# Patient Record
Sex: FEMALE | Race: WHITE | Employment: UNEMPLOYED | URBAN - METROPOLITAN AREA
[De-identification: names, ages, dates, MRNs, and addresses within clinical notes are randomized per-mention and may not be internally consistent; named-entity substitution may affect disease eponyms.]

---

## 2017-03-13 ENCOUNTER — HOSPITAL ENCOUNTER (OUTPATIENT)
Age: 62
Setting detail: SPECIMEN
Discharge: HOME | End: 2017-03-13
Payer: COMMERCIAL

## 2017-03-13 ENCOUNTER — OFFICE VISIT (OUTPATIENT)
Dept: FAMILY MEDICINE CLINIC | Age: 62
End: 2017-03-13
Payer: COMMERCIAL

## 2017-03-13 VITALS
BODY MASS INDEX: 31.58 KG/M2 | WEIGHT: 184 LBS | SYSTOLIC BLOOD PRESSURE: 150 MMHG | HEART RATE: 60 BPM | DIASTOLIC BLOOD PRESSURE: 80 MMHG

## 2017-03-13 DIAGNOSIS — N30.00 ACUTE CYSTITIS WITHOUT HEMATURIA: Primary | ICD-10-CM

## 2017-03-13 DIAGNOSIS — Z12.39 BREAST CANCER SCREENING: ICD-10-CM

## 2017-03-13 LAB
BILIRUBIN, POC: ABNORMAL
BLOOD URINE, POC: ABNORMAL
CLARITY, POC: ABNORMAL
COLOR, POC: YELLOW
GLUCOSE URINE, POC: ABNORMAL
KETONES, POC: ABNORMAL
LEUKOCYTE EST, POC: ABNORMAL
NITRITE, POC: ABNORMAL
PH, POC: 5.5
PROTEIN, POC: 30
SPECIFIC GRAVITY, POC: 1.03
UROBILINOGEN, POC: 0.2

## 2017-03-13 PROCEDURE — 81003 URINALYSIS AUTO W/O SCOPE: CPT | Performed by: FAMILY MEDICINE

## 2017-03-13 PROCEDURE — 99213 OFFICE O/P EST LOW 20 MIN: CPT | Performed by: FAMILY MEDICINE

## 2017-03-13 RX ORDER — CIPROFLOXACIN 250 MG/1
250 TABLET, FILM COATED ORAL 2 TIMES DAILY
Qty: 20 TABLET | Refills: 0 | Status: SHIPPED | OUTPATIENT
Start: 2017-03-13 | End: 2017-03-23

## 2017-03-13 RX ORDER — OXYCODONE HYDROCHLORIDE AND ACETAMINOPHEN 5; 325 MG/1; MG/1
1 TABLET ORAL 4 TIMES DAILY PRN
Refills: 0 | COMMUNITY
Start: 2017-02-25 | End: 2018-11-20 | Stop reason: SDUPTHER

## 2017-03-13 ASSESSMENT — ENCOUNTER SYMPTOMS
SHORTNESS OF BREATH: 0
BACK PAIN: 0
DIARRHEA: 0
SORE THROAT: 0
NAUSEA: 0
SINUS PRESSURE: 0
VOMITING: 0
COUGH: 0

## 2017-03-13 ASSESSMENT — PATIENT HEALTH QUESTIONNAIRE - PHQ9
2. FEELING DOWN, DEPRESSED OR HOPELESS: 0
SUM OF ALL RESPONSES TO PHQ9 QUESTIONS 1 & 2: 0
SUM OF ALL RESPONSES TO PHQ QUESTIONS 1-9: 0
1. LITTLE INTEREST OR PLEASURE IN DOING THINGS: 0

## 2017-03-16 LAB
CULTURE: NORMAL
CULTURE: NORMAL
Lab: NORMAL
SPECIMEN DESCRIPTION: NORMAL
STATUS: NORMAL

## 2017-04-10 ENCOUNTER — OFFICE VISIT (OUTPATIENT)
Dept: FAMILY MEDICINE CLINIC | Age: 62
End: 2017-04-10
Payer: COMMERCIAL

## 2017-04-10 ENCOUNTER — HOSPITAL ENCOUNTER (OUTPATIENT)
Age: 62
Setting detail: SPECIMEN
Discharge: HOME | End: 2017-04-10
Payer: COMMERCIAL

## 2017-04-10 VITALS
BODY MASS INDEX: 31.58 KG/M2 | DIASTOLIC BLOOD PRESSURE: 80 MMHG | HEART RATE: 73 BPM | SYSTOLIC BLOOD PRESSURE: 130 MMHG | WEIGHT: 184 LBS

## 2017-04-10 DIAGNOSIS — R35.0 FREQUENT URINATION: ICD-10-CM

## 2017-04-10 DIAGNOSIS — R31.9 HEMATURIA: ICD-10-CM

## 2017-04-10 DIAGNOSIS — M47.12 CERVICAL SPONDYLOSIS WITH MYELOPATHY: Chronic | ICD-10-CM

## 2017-04-10 DIAGNOSIS — N39.41 URGE INCONTINENCE: Primary | ICD-10-CM

## 2017-04-10 LAB
BILIRUBIN, POC: ABNORMAL
BLOOD URINE, POC: ABNORMAL
CLARITY, POC: ABNORMAL
COLOR, POC: YELLOW
GLUCOSE URINE, POC: ABNORMAL
KETONES, POC: ABNORMAL
LEUKOCYTE EST, POC: ABNORMAL
NITRITE, POC: ABNORMAL
PH, POC: 6
PROTEIN, POC: 30
SPECIFIC GRAVITY, POC: 1.02
UROBILINOGEN, POC: 0.2

## 2017-04-10 PROCEDURE — 99213 OFFICE O/P EST LOW 20 MIN: CPT | Performed by: FAMILY MEDICINE

## 2017-04-10 PROCEDURE — 81003 URINALYSIS AUTO W/O SCOPE: CPT | Performed by: FAMILY MEDICINE

## 2017-04-10 ASSESSMENT — ENCOUNTER SYMPTOMS
SORE THROAT: 0
DIARRHEA: 0
NAUSEA: 0
SINUS PRESSURE: 0
SHORTNESS OF BREATH: 0
VOMITING: 0
BACK PAIN: 0
COUGH: 0

## 2017-04-11 LAB
CULTURE: NORMAL
CULTURE: NORMAL
Lab: NORMAL
SPECIMEN DESCRIPTION: NORMAL
STATUS: NORMAL

## 2017-04-22 ENCOUNTER — HOSPITAL ENCOUNTER (OUTPATIENT)
Dept: MAMMOGRAPHY | Age: 62
Discharge: HOME | End: 2017-04-22
Payer: COMMERCIAL

## 2017-04-22 DIAGNOSIS — Z12.39 BREAST CANCER SCREENING: ICD-10-CM

## 2017-04-22 PROCEDURE — 77063 BREAST TOMOSYNTHESIS BI: CPT

## 2017-12-20 ENCOUNTER — HOSPITAL ENCOUNTER (OUTPATIENT)
Age: 62
Discharge: HOME | End: 2017-12-20
Payer: COMMERCIAL

## 2017-12-20 LAB
AMPHETAMINE SCREEN URINE: NEGATIVE
BARBITURATE SCREEN URINE: NEGATIVE
BENZODIAZEPINE SCREEN, URINE: NEGATIVE
BUPRENORPHINE URINE: ABNORMAL
CANNABINOID SCREEN URINE: NEGATIVE
COCAINE METABOLITE, URINE: NEGATIVE
MDMA URINE: ABNORMAL
METHADONE SCREEN, URINE: NEGATIVE
METHAMPHETAMINE, URINE: ABNORMAL
OPIATES, URINE: NEGATIVE
OXYCODONE SCREEN URINE: POSITIVE
PHENCYCLIDINE, URINE: NEGATIVE
PROPOXYPHENE, URINE: ABNORMAL
TEST INFORMATION: ABNORMAL
TRICYCLIC ANTIDEPRESSANTS, UR: ABNORMAL

## 2017-12-20 PROCEDURE — 80307 DRUG TEST PRSMV CHEM ANLYZR: CPT

## 2018-10-23 ENCOUNTER — OFFICE VISIT (OUTPATIENT)
Dept: FAMILY MEDICINE CLINIC | Age: 63
End: 2018-10-23
Payer: COMMERCIAL

## 2018-10-23 VITALS
HEIGHT: 64 IN | HEART RATE: 70 BPM | BODY MASS INDEX: 31.24 KG/M2 | DIASTOLIC BLOOD PRESSURE: 76 MMHG | WEIGHT: 183 LBS | SYSTOLIC BLOOD PRESSURE: 132 MMHG

## 2018-10-23 DIAGNOSIS — G89.29 CHRONIC MIDLINE LOW BACK PAIN WITHOUT SCIATICA: ICD-10-CM

## 2018-10-23 DIAGNOSIS — M47.12 CERVICAL SPONDYLOSIS WITH MYELOPATHY: Primary | Chronic | ICD-10-CM

## 2018-10-23 DIAGNOSIS — M54.50 CHRONIC MIDLINE LOW BACK PAIN WITHOUT SCIATICA: ICD-10-CM

## 2018-10-23 PROCEDURE — 99213 OFFICE O/P EST LOW 20 MIN: CPT | Performed by: FAMILY MEDICINE

## 2018-10-23 RX ORDER — OXYCODONE HYDROCHLORIDE AND ACETAMINOPHEN 5; 325 MG/1; MG/1
1 TABLET ORAL EVERY 6 HOURS PRN
Qty: 120 TABLET | Refills: 0 | Status: SHIPPED | OUTPATIENT
Start: 2018-10-23 | End: 2018-11-27 | Stop reason: SDUPTHER

## 2018-10-23 ASSESSMENT — ENCOUNTER SYMPTOMS
COUGH: 0
BACK PAIN: 1
SINUS PRESSURE: 0
VOMITING: 0
SHORTNESS OF BREATH: 0
SORE THROAT: 0
NAUSEA: 0
DIARRHEA: 0

## 2018-10-23 ASSESSMENT — PATIENT HEALTH QUESTIONNAIRE - PHQ9
SUM OF ALL RESPONSES TO PHQ QUESTIONS 1-9: 0
2. FEELING DOWN, DEPRESSED OR HOPELESS: 0
SUM OF ALL RESPONSES TO PHQ QUESTIONS 1-9: 0
SUM OF ALL RESPONSES TO PHQ9 QUESTIONS 1 & 2: 0
1. LITTLE INTEREST OR PLEASURE IN DOING THINGS: 0

## 2018-11-06 ENCOUNTER — HOSPITAL ENCOUNTER (OUTPATIENT)
Dept: MRI IMAGING | Age: 63
Discharge: HOME | End: 2018-11-08
Payer: COMMERCIAL

## 2018-11-06 DIAGNOSIS — G89.29 CHRONIC MIDLINE LOW BACK PAIN WITHOUT SCIATICA: ICD-10-CM

## 2018-11-06 DIAGNOSIS — Z12.11 COLON CANCER SCREENING: Primary | ICD-10-CM

## 2018-11-06 DIAGNOSIS — M54.50 CHRONIC MIDLINE LOW BACK PAIN WITHOUT SCIATICA: ICD-10-CM

## 2018-11-06 PROCEDURE — 72148 MRI LUMBAR SPINE W/O DYE: CPT

## 2018-11-20 ENCOUNTER — OFFICE VISIT (OUTPATIENT)
Dept: FAMILY MEDICINE CLINIC | Age: 63
End: 2018-11-20
Payer: COMMERCIAL

## 2018-11-20 ENCOUNTER — TELEPHONE (OUTPATIENT)
Dept: FAMILY MEDICINE CLINIC | Age: 63
End: 2018-11-20

## 2018-11-20 VITALS
SYSTOLIC BLOOD PRESSURE: 150 MMHG | DIASTOLIC BLOOD PRESSURE: 82 MMHG | HEART RATE: 64 BPM | WEIGHT: 181 LBS | BODY MASS INDEX: 31.07 KG/M2

## 2018-11-20 DIAGNOSIS — Z12.11 COLON CANCER SCREENING: ICD-10-CM

## 2018-11-20 DIAGNOSIS — M51.9 LUMBAR DISC DISEASE: Primary | ICD-10-CM

## 2018-11-20 PROCEDURE — 99213 OFFICE O/P EST LOW 20 MIN: CPT | Performed by: FAMILY MEDICINE

## 2018-11-20 ASSESSMENT — ENCOUNTER SYMPTOMS
SINUS PRESSURE: 0
VOMITING: 0
COUGH: 0
DIARRHEA: 0
SHORTNESS OF BREATH: 0
NAUSEA: 0
BACK PAIN: 1
SORE THROAT: 0

## 2018-11-27 DIAGNOSIS — M54.50 CHRONIC MIDLINE LOW BACK PAIN WITHOUT SCIATICA: ICD-10-CM

## 2018-11-27 DIAGNOSIS — M47.12 CERVICAL SPONDYLOSIS WITH MYELOPATHY: Chronic | ICD-10-CM

## 2018-11-27 DIAGNOSIS — G89.29 CHRONIC MIDLINE LOW BACK PAIN WITHOUT SCIATICA: ICD-10-CM

## 2018-11-27 RX ORDER — OXYCODONE HYDROCHLORIDE AND ACETAMINOPHEN 5; 325 MG/1; MG/1
1 TABLET ORAL EVERY 6 HOURS PRN
Qty: 120 TABLET | Refills: 0 | Status: SHIPPED | OUTPATIENT
Start: 2018-11-27 | End: 2018-12-27 | Stop reason: SDUPTHER

## 2018-12-03 ENCOUNTER — HOSPITAL ENCOUNTER (OUTPATIENT)
Dept: PHYSICAL THERAPY | Facility: CLINIC | Age: 63
Setting detail: THERAPIES SERIES
Discharge: HOME | End: 2018-12-03
Payer: COMMERCIAL

## 2018-12-03 PROCEDURE — 97161 PT EVAL LOW COMPLEX 20 MIN: CPT

## 2018-12-03 PROCEDURE — 97110 THERAPEUTIC EXERCISES: CPT

## 2018-12-03 NOTE — CONSULTS
MRI:  Lumbar Spine 11/06/18  Impression   1.  Lumbar spondylosis as described above most significant at L5/S1 with   there is severe bilateral neural foraminal narrowing and effacement of   exiting bilateral L5 nerve roots.       2.  No fracture or dislocation. Medications: [x] Refer to full medical record [] None [] Other:  Allergies:      [x] Refer to full medical record [] None [] Other:    Function:  Hand Dominance  [x] Right  [] Left  Working:    [x]  Disability   Job/ADL Description:   Pain:  [x] Yes  [] No Location: low back  Pain Rating: (0-10 scale) 5/10 currently, 9-10/10 at night time   Pain altered Tx:  [] Yes  [] No  Action:  Symptoms:  [] Improving [] Worsening [x] Same  Better:  [x] AM    [] PM    [x] Sit    [] Rise/Sit    []Stand    [] Walk    [] Lying    [] Other:  Worse: [] AM    [x] PM    [] Sit    [] Rise/Sit    [x]Stand    [x] Walk    [] Lying    [] Bend                             [] Valsalva    [] Other:  Sleep: [] OK    [x] Disturbed- pain wakes patient up at night time     Objective:   STRENGTH  ROM    Left Right     L1-2 Hip Flex 3+ 4-     Hip Abd 3+ 3+     Hip Ext  3+ 3+      L3-4 Knee Ext 4+ 4+      L4 Ankle DF 4+ 4+      L5 EHL       S1 Plant. Flex 4- 4- Lumbar    Abdominals 2-  Flexion 30*   Erector Spinae 4-  Extension 6*      Rotation L WFL R 25% limited      Sidebend L 25 R 19*   * = pain reproduction     TESTS (+/-) LEFT RIGHT Not Tested   SLR [] sit [x] supine HS tight HS tight  []   SKTC  ? Pain []   DKTC  ? Pain []   Slump/Dural - - []   ELLIE - + []   Joint Mobility norm Norm  []   Yumiko Tests ? Pain ?  Pain No Change Not Tested   RFIS [] [] [] []   TYLER [] [] [] []   RFIL [] [x] [] []   REIL [] [] [] [x]   Rep Prot [] [] [] []   Rep Retract [] [] [] []     Hamstring flexibility:  RLE: lacking 27 °   LLE: lacking 25°     + ELYs on L    OBSERVATION No Deficit Deficit Not Tested Comments   Posture       Forward Head [] [x] []    Rounded Shoulders [] [x] []    Kyphosis [x] [] understanding. [x] Demonstrates understanding. [x] Needs Review. [] Demonstrates/verbalizes understanding of HEP/Ed previously given. Treatment Plan:  [x] Therapeutic Exercise    [x] Modalities:  [x] Therapeutic Activity    [] Ultrasound  [x] Electrical Stimulation  [x] Gait Training     []Massage       [] Lumbar/Cervical Traction  [x] Neuromuscular Re-education [x] Cold/hotpack [] Iontophoresis: 4 mg/mL  [x] Instruction in HEP             Dexamethasone Sodium  [x] Manual Therapy             Phosphate 40-80 mAmin  [] Aquatic Therapy   [] Dry Needling [] Other:    []  Medication allergies reviewed for use of    Dexamethasone Sodium Phosphate 4mg/ml     with iontophoresis treatments. Pt is not allergic.     Frequency:  2 x/week for 16 visits    Todays Treatment:  Modalities:   Precautions:  Exercises:  Exercise Reps/ Time Weight/ Level Comments   SUPINE       Hamstring stretch  2x30\" ea  With strap    DKTC 10 x 5\"  With sheet/strap assistance for ? ease    SLR 10x ea           SIDE LYING       Clamshells 15x ea red          STANDING       Hip ext, abduction  15x  AROM  Pt unable to perform hip ext w/o knee bent               Other:    Specific Instructions for next treatment: BLE strengthening (focus on extensors and abductors), core stabilization (issue HEP for core stab next session), HS and hip flexor stretching, modalities as needed      Evaluation Complexity:  History (Personal factors, comorbidities) [] 0 [] 1-2 [x] 3+   Exam (limitations, restrictions) [] 1-2 [] 3 [x] 4+   Clinical presentation (progression) [x] Stable [] Evolving  [] Unstable   Decision Making [x] Low [] Moderate [] High    [x] Low Complexity [] Moderate Complexity [] High Complexity       Treatment Charges: Mins Units   [x] Evaluation       [x]  Low       []  Moderate       []  High 45 1   []  Modalities     [x]  Ther Exercise 15 1   []  Manual Therapy     []  Ther Activities     []  Aquatics     []  Vasocompression     []  Other

## 2018-12-10 ENCOUNTER — HOSPITAL ENCOUNTER (OUTPATIENT)
Dept: PHYSICAL THERAPY | Facility: CLINIC | Age: 63
Setting detail: THERAPIES SERIES
Discharge: HOME | End: 2018-12-10
Payer: COMMERCIAL

## 2018-12-12 ENCOUNTER — APPOINTMENT (OUTPATIENT)
Dept: PHYSICAL THERAPY | Facility: CLINIC | Age: 63
End: 2018-12-12
Payer: COMMERCIAL

## 2018-12-14 ENCOUNTER — APPOINTMENT (OUTPATIENT)
Dept: PHYSICAL THERAPY | Facility: CLINIC | Age: 63
End: 2018-12-14
Payer: COMMERCIAL

## 2018-12-18 ENCOUNTER — APPOINTMENT (OUTPATIENT)
Dept: PHYSICAL THERAPY | Facility: CLINIC | Age: 63
End: 2018-12-18
Payer: COMMERCIAL

## 2018-12-21 ENCOUNTER — APPOINTMENT (OUTPATIENT)
Dept: PHYSICAL THERAPY | Facility: CLINIC | Age: 63
End: 2018-12-21
Payer: COMMERCIAL

## 2018-12-27 DIAGNOSIS — M54.50 CHRONIC MIDLINE LOW BACK PAIN WITHOUT SCIATICA: ICD-10-CM

## 2018-12-27 DIAGNOSIS — G89.29 CHRONIC MIDLINE LOW BACK PAIN WITHOUT SCIATICA: ICD-10-CM

## 2018-12-27 DIAGNOSIS — M47.12 CERVICAL SPONDYLOSIS WITH MYELOPATHY: Chronic | ICD-10-CM

## 2018-12-27 RX ORDER — OXYCODONE HYDROCHLORIDE AND ACETAMINOPHEN 5; 325 MG/1; MG/1
TABLET ORAL
Qty: 120 TABLET | Refills: 0 | Status: SHIPPED | OUTPATIENT
Start: 2018-12-27 | End: 2019-01-29 | Stop reason: SDUPTHER

## 2019-01-15 ENCOUNTER — HOSPITAL ENCOUNTER (OUTPATIENT)
Dept: PHYSICAL THERAPY | Facility: CLINIC | Age: 64
Setting detail: THERAPIES SERIES
Discharge: HOME | End: 2019-01-15

## 2019-01-15 NOTE — DISCHARGE SUMMARY
[] Will Anthony        Outpatient Physical                Therapy       [address]       Phone: [phone]       Fax: [phone] [x] [facility]       Promotion at [address] [place] [address]       Phone: [phone]       Fax: [phone] [] [facility]     [address] [facility]     Phone: [phone]     Fax:  [phone]     Physical Therapy Discharge Note    Date: 1/15/2019      Patient: Alexey Porras  : 1955  MRN: [id number]    Physician: Debbie Hernandez MD                     Insurance: Camron Barney 150, 30 visits   Medical Diagnosis: M51.9 (ICD-10-CM) - Lumbar disc disease                   Rehab Codes: M54.5, M54.9, R26.81, M62.81  Onset Date: 2017              Next 's appt. : to be scheduled   Total visits attended: 1  Cancels/No shows: cancelled further appointments   Date of initial visit: 18                Date of final visit: 18       Discharge Status:     Pt cancelled all future appointments d/t  being sick and stated that she would call to reschedule once she was able to Return. It has been over 1 month since initial evaluation and pt has failed to make additional appointments for therapy. Pt. Is now discharged. Electronically signed by: Anny Nolan PT    If you have any questions or concerns, please don't hesitate to call.   Thank you for your referral.

## 2019-01-29 DIAGNOSIS — M47.12 CERVICAL SPONDYLOSIS WITH MYELOPATHY: Chronic | ICD-10-CM

## 2019-01-29 DIAGNOSIS — M54.50 CHRONIC MIDLINE LOW BACK PAIN WITHOUT SCIATICA: ICD-10-CM

## 2019-01-29 DIAGNOSIS — G89.29 CHRONIC MIDLINE LOW BACK PAIN WITHOUT SCIATICA: ICD-10-CM

## 2019-01-29 RX ORDER — OXYCODONE HYDROCHLORIDE AND ACETAMINOPHEN 5; 325 MG/1; MG/1
1 TABLET ORAL EVERY 6 HOURS PRN
Qty: 120 TABLET | Refills: 0 | Status: SHIPPED | OUTPATIENT
Start: 2019-01-29 | End: 2019-02-25 | Stop reason: SDUPTHER

## 2019-02-25 DIAGNOSIS — G89.29 CHRONIC MIDLINE LOW BACK PAIN WITHOUT SCIATICA: ICD-10-CM

## 2019-02-25 DIAGNOSIS — M47.12 CERVICAL SPONDYLOSIS WITH MYELOPATHY: Chronic | ICD-10-CM

## 2019-02-25 DIAGNOSIS — M54.50 CHRONIC MIDLINE LOW BACK PAIN WITHOUT SCIATICA: ICD-10-CM

## 2019-02-25 RX ORDER — OXYCODONE HYDROCHLORIDE AND ACETAMINOPHEN 5; 325 MG/1; MG/1
1 TABLET ORAL EVERY 6 HOURS PRN
Qty: 120 TABLET | Refills: 0 | Status: SHIPPED | OUTPATIENT
Start: 2019-02-25 | End: 2019-03-28 | Stop reason: SDUPTHER

## 2019-02-28 ENCOUNTER — OFFICE VISIT (OUTPATIENT)
Dept: FAMILY MEDICINE CLINIC | Age: 64
End: 2019-02-28
Payer: COMMERCIAL

## 2019-02-28 VITALS
BODY MASS INDEX: 31.76 KG/M2 | SYSTOLIC BLOOD PRESSURE: 136 MMHG | HEART RATE: 68 BPM | DIASTOLIC BLOOD PRESSURE: 72 MMHG | WEIGHT: 185 LBS

## 2019-02-28 DIAGNOSIS — G89.29 CHRONIC LOW BACK PAIN, UNSPECIFIED BACK PAIN LATERALITY, WITH SCIATICA PRESENCE UNSPECIFIED: Primary | ICD-10-CM

## 2019-02-28 DIAGNOSIS — M54.5 CHRONIC LOW BACK PAIN, UNSPECIFIED BACK PAIN LATERALITY, WITH SCIATICA PRESENCE UNSPECIFIED: Primary | ICD-10-CM

## 2019-02-28 PROCEDURE — 99213 OFFICE O/P EST LOW 20 MIN: CPT | Performed by: FAMILY MEDICINE

## 2019-02-28 ASSESSMENT — ENCOUNTER SYMPTOMS
BACK PAIN: 0
SORE THROAT: 0
DIARRHEA: 0
VOMITING: 0
COUGH: 0
NAUSEA: 0
SHORTNESS OF BREATH: 0
SINUS PRESSURE: 0

## 2019-02-28 ASSESSMENT — PATIENT HEALTH QUESTIONNAIRE - PHQ9
2. FEELING DOWN, DEPRESSED OR HOPELESS: 0
1. LITTLE INTEREST OR PLEASURE IN DOING THINGS: 0
SUM OF ALL RESPONSES TO PHQ QUESTIONS 1-9: 0
SUM OF ALL RESPONSES TO PHQ9 QUESTIONS 1 & 2: 0
SUM OF ALL RESPONSES TO PHQ QUESTIONS 1-9: 0

## 2019-03-27 DIAGNOSIS — G89.29 CHRONIC MIDLINE LOW BACK PAIN WITHOUT SCIATICA: ICD-10-CM

## 2019-03-27 DIAGNOSIS — M54.50 CHRONIC MIDLINE LOW BACK PAIN WITHOUT SCIATICA: ICD-10-CM

## 2019-03-27 DIAGNOSIS — M47.12 CERVICAL SPONDYLOSIS WITH MYELOPATHY: Chronic | ICD-10-CM

## 2019-03-28 RX ORDER — OXYCODONE HYDROCHLORIDE AND ACETAMINOPHEN 5; 325 MG/1; MG/1
1 TABLET ORAL EVERY 6 HOURS PRN
Qty: 120 TABLET | Refills: 0 | Status: SHIPPED | OUTPATIENT
Start: 2019-03-28 | End: 2019-04-30 | Stop reason: SDUPTHER

## 2019-04-25 DIAGNOSIS — G89.29 CHRONIC MIDLINE LOW BACK PAIN WITHOUT SCIATICA: ICD-10-CM

## 2019-04-25 DIAGNOSIS — M54.50 CHRONIC MIDLINE LOW BACK PAIN WITHOUT SCIATICA: ICD-10-CM

## 2019-04-25 DIAGNOSIS — M47.12 CERVICAL SPONDYLOSIS WITH MYELOPATHY: Chronic | ICD-10-CM

## 2019-04-25 RX ORDER — OXYCODONE HYDROCHLORIDE AND ACETAMINOPHEN 5; 325 MG/1; MG/1
1 TABLET ORAL EVERY 6 HOURS PRN
Qty: 120 TABLET | Refills: 0 | OUTPATIENT
Start: 2019-04-25 | End: 2019-05-25

## 2019-04-25 NOTE — TELEPHONE ENCOUNTER
Patient requesting medication refill. Pharmacy on file is correct. Please contact patient with any questions.

## 2019-04-30 DIAGNOSIS — M47.12 CERVICAL SPONDYLOSIS WITH MYELOPATHY: Chronic | ICD-10-CM

## 2019-04-30 DIAGNOSIS — G89.29 CHRONIC MIDLINE LOW BACK PAIN WITHOUT SCIATICA: ICD-10-CM

## 2019-04-30 DIAGNOSIS — M54.50 CHRONIC MIDLINE LOW BACK PAIN WITHOUT SCIATICA: ICD-10-CM

## 2019-04-30 NOTE — TELEPHONE ENCOUNTER
Patient called and said she is in a lot of pain and would like to know if she can have a refill of her oxycodone 5-325 MG sent to her pharmacy

## 2019-05-01 RX ORDER — OXYCODONE HYDROCHLORIDE AND ACETAMINOPHEN 5; 325 MG/1; MG/1
TABLET ORAL
Qty: 120 TABLET | Refills: 0 | Status: SHIPPED | OUTPATIENT
Start: 2019-05-01 | End: 2019-06-12 | Stop reason: SDUPTHER

## 2019-05-29 ENCOUNTER — OFFICE VISIT (OUTPATIENT)
Dept: FAMILY MEDICINE CLINIC | Age: 64
End: 2019-05-29

## 2019-05-29 VITALS
BODY MASS INDEX: 30.86 KG/M2 | SYSTOLIC BLOOD PRESSURE: 132 MMHG | OXYGEN SATURATION: 96 % | DIASTOLIC BLOOD PRESSURE: 76 MMHG | WEIGHT: 179.8 LBS | HEART RATE: 83 BPM

## 2019-05-29 DIAGNOSIS — M47.12 CERVICAL SPONDYLOSIS WITH MYELOPATHY: Chronic | ICD-10-CM

## 2019-05-29 DIAGNOSIS — R00.2 HEART PALPITATIONS: Primary | ICD-10-CM

## 2019-05-29 PROCEDURE — 93000 ELECTROCARDIOGRAM COMPLETE: CPT | Performed by: FAMILY MEDICINE

## 2019-05-29 PROCEDURE — 99213 OFFICE O/P EST LOW 20 MIN: CPT | Performed by: FAMILY MEDICINE

## 2019-05-29 ASSESSMENT — ENCOUNTER SYMPTOMS
COUGH: 0
BACK PAIN: 1
NAUSEA: 0
SINUS PRESSURE: 0
SHORTNESS OF BREATH: 0
SORE THROAT: 0
VOMITING: 0
DIARRHEA: 0
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
## 2019-06-12 DIAGNOSIS — M47.12 CERVICAL SPONDYLOSIS WITH MYELOPATHY: Chronic | ICD-10-CM

## 2019-06-12 DIAGNOSIS — G89.29 CHRONIC MIDLINE LOW BACK PAIN WITHOUT SCIATICA: ICD-10-CM

## 2019-06-12 DIAGNOSIS — M54.50 CHRONIC MIDLINE LOW BACK PAIN WITHOUT SCIATICA: ICD-10-CM

## 2019-06-12 RX ORDER — OXYCODONE HYDROCHLORIDE AND ACETAMINOPHEN 5; 325 MG/1; MG/1
1 TABLET ORAL EVERY 6 HOURS PRN
Qty: 120 TABLET | Refills: 0 | Status: SHIPPED | OUTPATIENT
Start: 2019-06-12 | End: 2019-07-17 | Stop reason: SDUPTHER

## 2019-07-16 DIAGNOSIS — M54.50 CHRONIC MIDLINE LOW BACK PAIN WITHOUT SCIATICA: ICD-10-CM

## 2019-07-16 DIAGNOSIS — G89.29 CHRONIC MIDLINE LOW BACK PAIN WITHOUT SCIATICA: ICD-10-CM

## 2019-07-16 DIAGNOSIS — M47.12 CERVICAL SPONDYLOSIS WITH MYELOPATHY: Chronic | ICD-10-CM

## 2019-07-17 RX ORDER — OXYCODONE HYDROCHLORIDE AND ACETAMINOPHEN 5; 325 MG/1; MG/1
1 TABLET ORAL EVERY 6 HOURS PRN
Qty: 120 TABLET | Refills: 0 | Status: SHIPPED | OUTPATIENT
Start: 2019-07-17 | End: 2019-08-14 | Stop reason: SDUPTHER

## 2019-07-17 NOTE — TELEPHONE ENCOUNTER
Last Med refill: 6/12/19    Next Visit Date:  Future Appointments   Date Time Provider Denise Navarro   8/30/2019 10:30 AM Reese Babinski, MD [facility] [address] Maintenance   Topic Date Due    Hepatitis C screen  1955    HIV screen  03/18/1972    DTaP/Tdap/Td vaccine (1 - Tdap) 03/18/1976    Cervical cancer screen  03/18/1978    Lipid screen  03/18/1997    Diabetes screen  03/18/1997    Shingles Vaccine (1 of 2) 03/18/2007    Colon cancer screen colonoscopy  03/18/2007    Low dose CT lung screening  03/18/2012    Breast cancer screen  05/29/2020 (Originally 4/22/2019)    Flu vaccine (1) 09/01/2020 (Originally 9/1/2019)    Pneumococcal 0-64 years Vaccine (1 of 1 - PPSV23) 05/29/2022 (Originally 3/18/1963)       No results found for: LABA1C          ( goal A1C is < 7)   No results found for: LABMICR  No results found for: LDLCHOLESTEROL, LDLCALC    (goal LDL is <100)   AST (U/L)   Date Value   07/28/2014 19     ALT (U/L)   Date Value   07/28/2014 19     BUN (mg/dL)   Date Value   07/28/2014 11     BP Readings from Last 3 Encounters:   05/29/19 132/76   02/28/19 136/72   11/20/18 (!) 150/82          (goal 120/80)    All Future Testing planned in [facility]              Patient Active Problem List:     Cervical spondylosis with myelopathy

## 2019-08-14 DIAGNOSIS — G89.29 CHRONIC MIDLINE LOW BACK PAIN WITHOUT SCIATICA: ICD-10-CM

## 2019-08-14 DIAGNOSIS — M47.12 CERVICAL SPONDYLOSIS WITH MYELOPATHY: Chronic | ICD-10-CM

## 2019-08-14 DIAGNOSIS — M54.50 CHRONIC MIDLINE LOW BACK PAIN WITHOUT SCIATICA: ICD-10-CM

## 2019-08-14 RX ORDER — OXYCODONE HYDROCHLORIDE AND ACETAMINOPHEN 5; 325 MG/1; MG/1
1 TABLET ORAL EVERY 6 HOURS PRN
Qty: 120 TABLET | Refills: 0 | Status: SHIPPED | OUTPATIENT
Start: 2019-08-14 | End: 2019-09-10 | Stop reason: SDUPTHER

## 2019-08-22 ENCOUNTER — OFFICE VISIT (OUTPATIENT)
Dept: FAMILY MEDICINE CLINIC | Age: 64
End: 2019-08-22
Payer: MEDICARE

## 2019-08-22 VITALS
OXYGEN SATURATION: 98 % | WEIGHT: 170.2 LBS | DIASTOLIC BLOOD PRESSURE: 84 MMHG | BODY MASS INDEX: 29.21 KG/M2 | HEART RATE: 65 BPM | SYSTOLIC BLOOD PRESSURE: 134 MMHG

## 2019-08-22 DIAGNOSIS — F43.0 STRESS REACTION: ICD-10-CM

## 2019-08-22 DIAGNOSIS — M47.12 CERVICAL SPONDYLOSIS WITH MYELOPATHY: Primary | Chronic | ICD-10-CM

## 2019-08-22 PROCEDURE — G8417 CALC BMI ABV UP PARAM F/U: HCPCS | Performed by: FAMILY MEDICINE

## 2019-08-22 PROCEDURE — 99213 OFFICE O/P EST LOW 20 MIN: CPT | Performed by: FAMILY MEDICINE

## 2019-08-22 PROCEDURE — G8427 DOCREV CUR MEDS BY ELIG CLIN: HCPCS | Performed by: FAMILY MEDICINE

## 2019-08-22 PROCEDURE — 4004F PT TOBACCO SCREEN RCVD TLK: CPT | Performed by: FAMILY MEDICINE

## 2019-08-22 PROCEDURE — 3017F COLORECTAL CA SCREEN DOC REV: CPT | Performed by: FAMILY MEDICINE

## 2019-08-22 ASSESSMENT — ENCOUNTER SYMPTOMS
SINUS PRESSURE: 0
COUGH: 0
SORE THROAT: 0
VOMITING: 0
DIARRHEA: 0
SHORTNESS OF BREATH: 0
NAUSEA: 0
BACK PAIN: 1

## 2019-09-10 DIAGNOSIS — M54.50 CHRONIC MIDLINE LOW BACK PAIN WITHOUT SCIATICA: ICD-10-CM

## 2019-09-10 DIAGNOSIS — G89.29 CHRONIC MIDLINE LOW BACK PAIN WITHOUT SCIATICA: ICD-10-CM

## 2019-09-10 DIAGNOSIS — M47.12 CERVICAL SPONDYLOSIS WITH MYELOPATHY: Chronic | ICD-10-CM

## 2019-09-10 RX ORDER — OXYCODONE HYDROCHLORIDE AND ACETAMINOPHEN 5; 325 MG/1; MG/1
1 TABLET ORAL EVERY 6 HOURS PRN
Qty: 120 TABLET | Refills: 0 | Status: SHIPPED | OUTPATIENT
Start: 2019-09-10 | End: 2019-10-10 | Stop reason: SDUPTHER

## 2019-09-10 NOTE — TELEPHONE ENCOUNTER
Last Med refill:8/14/19    Next Visit Date:  Future Appointments   Date Time Provider Denise Navarro   11/22/2019 10:45 AM Mc Yang  [facility]   Topic Date Due    Hepatitis C screen  1955    HIV screen  03/18/1972    DTaP/Tdap/Td vaccine (1 - Tdap) 03/18/1976    Cervical cancer screen  03/18/1978    Lipid screen  03/18/1997    Diabetes screen  03/18/1997    Shingles Vaccine (1 of 2) 03/18/2007    Colon cancer screen colonoscopy  03/18/2007    Low dose CT lung screening  03/18/2012    Breast cancer screen  05/29/2020 (Originally 4/22/2019)    Flu vaccine (1) 09/01/2020 (Originally 9/1/2019)    Pneumococcal 0-64 years Vaccine (1 of 1 - PPSV23) 05/29/2022 (Originally 3/18/1963)       No results found for: LABA1C          ( goal A1C is < 7)   No results found for: LABMICR  No results found for: LDLCHOLESTEROL, LDLCALC    (goal LDL is <100)   AST (U/L)   Date Value   07/28/2014 19     ALT (U/L)   Date Value   07/28/2014 19     BUN (mg/dL)   Date Value   07/28/2014 11     BP Readings from Last 3 Encounters:   08/22/19 134/84   05/29/19 132/76   02/28/19 136/72          (goal 120/80)    All Future Testing planned in [facility]              Patient Active Problem List:     Cervical spondylosis with myelopathy

## 2019-10-10 DIAGNOSIS — G89.29 CHRONIC MIDLINE LOW BACK PAIN WITHOUT SCIATICA: ICD-10-CM

## 2019-10-10 DIAGNOSIS — M54.50 CHRONIC MIDLINE LOW BACK PAIN WITHOUT SCIATICA: ICD-10-CM

## 2019-10-10 DIAGNOSIS — M47.12 CERVICAL SPONDYLOSIS WITH MYELOPATHY: Chronic | ICD-10-CM

## 2019-10-10 RX ORDER — OXYCODONE HYDROCHLORIDE AND ACETAMINOPHEN 5; 325 MG/1; MG/1
1 TABLET ORAL EVERY 6 HOURS PRN
Qty: 120 TABLET | Refills: 0 | Status: SHIPPED | OUTPATIENT
Start: 2019-10-10 | End: 2019-11-12 | Stop reason: SDUPTHER

## 2019-11-12 DIAGNOSIS — M54.50 CHRONIC MIDLINE LOW BACK PAIN WITHOUT SCIATICA: ICD-10-CM

## 2019-11-12 DIAGNOSIS — G89.29 CHRONIC MIDLINE LOW BACK PAIN WITHOUT SCIATICA: ICD-10-CM

## 2019-11-12 DIAGNOSIS — M47.12 CERVICAL SPONDYLOSIS WITH MYELOPATHY: Chronic | ICD-10-CM

## 2019-11-12 RX ORDER — OXYCODONE HYDROCHLORIDE AND ACETAMINOPHEN 5; 325 MG/1; MG/1
1 TABLET ORAL EVERY 6 HOURS PRN
Qty: 120 TABLET | Refills: 0 | Status: SHIPPED | OUTPATIENT
Start: 2019-11-12 | End: 2019-12-12 | Stop reason: SDUPTHER

## 2019-11-18 ENCOUNTER — OFFICE VISIT (OUTPATIENT)
Dept: FAMILY MEDICINE CLINIC | Age: 64
End: 2019-11-18
Payer: MEDICARE

## 2019-11-18 VITALS
OXYGEN SATURATION: 96 % | DIASTOLIC BLOOD PRESSURE: 64 MMHG | WEIGHT: 161.2 LBS | SYSTOLIC BLOOD PRESSURE: 110 MMHG | BODY MASS INDEX: 27.52 KG/M2 | HEART RATE: 72 BPM | HEIGHT: 64 IN

## 2019-11-18 DIAGNOSIS — M26.69 TMJ CAPSULITIS: Primary | ICD-10-CM

## 2019-11-18 PROCEDURE — 99213 OFFICE O/P EST LOW 20 MIN: CPT | Performed by: FAMILY MEDICINE

## 2019-11-18 PROCEDURE — 4004F PT TOBACCO SCREEN RCVD TLK: CPT | Performed by: FAMILY MEDICINE

## 2019-11-18 PROCEDURE — G8417 CALC BMI ABV UP PARAM F/U: HCPCS | Performed by: FAMILY MEDICINE

## 2019-11-18 PROCEDURE — G8484 FLU IMMUNIZE NO ADMIN: HCPCS | Performed by: FAMILY MEDICINE

## 2019-11-18 PROCEDURE — 3017F COLORECTAL CA SCREEN DOC REV: CPT | Performed by: FAMILY MEDICINE

## 2019-11-18 PROCEDURE — G8427 DOCREV CUR MEDS BY ELIG CLIN: HCPCS | Performed by: FAMILY MEDICINE

## 2019-11-18 RX ORDER — PREDNISONE 20 MG/1
20 TABLET ORAL 2 TIMES DAILY
Qty: 10 TABLET | Refills: 0 | Status: SHIPPED | OUTPATIENT
Start: 2019-11-18 | End: 2019-11-23

## 2019-11-18 ASSESSMENT — ENCOUNTER SYMPTOMS
COUGH: 0
VOMITING: 0
TROUBLE SWALLOWING: 1
SINUS PRESSURE: 0
DIARRHEA: 0
SHORTNESS OF BREATH: 0
BACK PAIN: 1
NAUSEA: 0
SORE THROAT: 0

## 2019-12-12 DIAGNOSIS — M47.12 CERVICAL SPONDYLOSIS WITH MYELOPATHY: Chronic | ICD-10-CM

## 2019-12-12 DIAGNOSIS — G89.29 CHRONIC MIDLINE LOW BACK PAIN WITHOUT SCIATICA: ICD-10-CM

## 2019-12-12 DIAGNOSIS — M54.50 CHRONIC MIDLINE LOW BACK PAIN WITHOUT SCIATICA: ICD-10-CM

## 2019-12-12 RX ORDER — OXYCODONE HYDROCHLORIDE AND ACETAMINOPHEN 5; 325 MG/1; MG/1
1 TABLET ORAL EVERY 6 HOURS PRN
Qty: 120 TABLET | Refills: 0 | Status: SHIPPED | OUTPATIENT
Start: 2019-12-12 | End: 2020-01-09 | Stop reason: SDUPTHER

## 2020-01-09 RX ORDER — OXYCODONE HYDROCHLORIDE AND ACETAMINOPHEN 5; 325 MG/1; MG/1
1 TABLET ORAL EVERY 6 HOURS PRN
Qty: 120 TABLET | Refills: 0 | Status: SHIPPED | OUTPATIENT
Start: 2020-01-09 | End: 2020-02-13 | Stop reason: SDUPTHER

## 2020-02-13 RX ORDER — OXYCODONE HYDROCHLORIDE AND ACETAMINOPHEN 5; 325 MG/1; MG/1
1 TABLET ORAL EVERY 6 HOURS PRN
Qty: 120 TABLET | Refills: 0 | Status: SHIPPED | OUTPATIENT
Start: 2020-02-13 | End: 2020-03-16 | Stop reason: SDUPTHER

## 2020-02-24 ENCOUNTER — OFFICE VISIT (OUTPATIENT)
Dept: FAMILY MEDICINE CLINIC | Age: 65
End: 2020-02-24
Payer: COMMERCIAL

## 2020-02-24 VITALS
HEART RATE: 70 BPM | SYSTOLIC BLOOD PRESSURE: 110 MMHG | HEIGHT: 64 IN | WEIGHT: 166.6 LBS | OXYGEN SATURATION: 98 % | BODY MASS INDEX: 28.44 KG/M2 | DIASTOLIC BLOOD PRESSURE: 66 MMHG

## 2020-02-24 PROCEDURE — 99213 OFFICE O/P EST LOW 20 MIN: CPT | Performed by: FAMILY MEDICINE

## 2020-02-24 RX ORDER — NALOXONE HYDROCHLORIDE 4 MG/.1ML
1 SPRAY NASAL PRN
Qty: 1 EACH | Refills: 0 | Status: SHIPPED | OUTPATIENT
Start: 2020-02-24 | End: 2020-07-23

## 2020-02-24 ASSESSMENT — PATIENT HEALTH QUESTIONNAIRE - PHQ9
SUM OF ALL RESPONSES TO PHQ QUESTIONS 1-9: 0
1. LITTLE INTEREST OR PLEASURE IN DOING THINGS: 0
SUM OF ALL RESPONSES TO PHQ9 QUESTIONS 1 & 2: 0
2. FEELING DOWN, DEPRESSED OR HOPELESS: 0
SUM OF ALL RESPONSES TO PHQ QUESTIONS 1-9: 0

## 2020-02-24 ASSESSMENT — ENCOUNTER SYMPTOMS
BACK PAIN: 1
SORE THROAT: 0
SHORTNESS OF BREATH: 0
VOMITING: 0
NAUSEA: 0
DIARRHEA: 0
SINUS PRESSURE: 0
COUGH: 1

## 2020-02-24 NOTE — PROGRESS NOTES
Negative for congestion, sinus pressure and sore throat. Eyes: Negative for visual disturbance. Respiratory: Positive for cough. Negative for shortness of breath. Cardiovascular: Negative for chest pain and leg swelling. Gastrointestinal: Negative for diarrhea, nausea and vomiting. Genitourinary: Negative for dysuria, menstrual problem, urgency and vaginal discharge. Musculoskeletal: Positive for back pain, gait problem and neck pain. Negative for arthralgias and myalgias. Skin: Negative for rash. Neurological: Positive for weakness. Negative for numbness and headaches. Psychiatric/Behavioral: Positive for dysphoric mood. Negative for sleep disturbance. The patient is nervous/anxious.        :   /66   Pulse 70   Ht 5' 4\" (1.626 m)   Wt 166 lb 9.6 oz (75.6 kg)   SpO2 98%   BMI 28.60 kg/m²     Physical Exam  Constitutional:       General: She is not in acute distress. Appearance: She is well-developed. She is not diaphoretic. HENT:      Head: Normocephalic and atraumatic. Mouth/Throat:      Pharynx: No oropharyngeal exudate. Eyes:      General: No scleral icterus. Neck:      Musculoskeletal: Neck supple. Vascular: No carotid bruit. Cardiovascular:      Rate and Rhythm: Normal rate. Heart sounds: No murmur. No friction rub. No gallop. Pulmonary:      Effort: No respiratory distress. Breath sounds: No wheezing or rales. Chest:      Chest wall: No tenderness. Musculoskeletal:         General: Deformity (bilateral fungal nail infection) present. Right lower leg: No edema. Left lower leg: No edema. Lymphadenopathy:      Cervical: No cervical adenopathy. Skin:     Findings: No rash. Neurological:      Mental Status: She is alert and oriented to person, place, and time. Cranial Nerves: No cranial nerve deficit. Psychiatric:         Behavior: Behavior normal.         Thought Content:  Thought content normal.         Judgment: Judgment

## 2020-02-25 ENCOUNTER — HOSPITAL ENCOUNTER (OUTPATIENT)
Age: 65
Setting detail: SPECIMEN
Discharge: HOME | End: 2020-02-25
Payer: COMMERCIAL

## 2020-02-25 LAB
ABSOLUTE EOS #: 0.11 K/UL (ref 0–0.44)
ABSOLUTE IMMATURE GRANULOCYTE: <0.03 K/UL (ref 0–0.3)
ABSOLUTE LYMPH #: 2.56 K/UL (ref 1.1–3.7)
ABSOLUTE MONO #: 0.33 K/UL (ref 0.1–1.2)
ALBUMIN SERPL-MCNC: 4.1 G/DL (ref 3.5–5.2)
ALBUMIN/GLOBULIN RATIO: 1.8 (ref 1–2.5)
ALP BLD-CCNC: 49 U/L (ref 35–104)
ALT SERPL-CCNC: 10 U/L (ref 5–33)
ANION GAP SERPL CALCULATED.3IONS-SCNC: 11 MMOL/L (ref 9–17)
AST SERPL-CCNC: 14 U/L
BASOPHILS # BLD: 1 % (ref 0–2)
BASOPHILS ABSOLUTE: 0.06 K/UL (ref 0–0.2)
BILIRUB SERPL-MCNC: 1.3 MG/DL (ref 0.3–1.2)
BUN BLDV-MCNC: 15 MG/DL (ref 8–23)
BUN/CREAT BLD: ABNORMAL (ref 9–20)
CALCIUM SERPL-MCNC: 9.3 MG/DL (ref 8.6–10.4)
CHLORIDE BLD-SCNC: 104 MMOL/L (ref 98–107)
CHOLESTEROL/HDL RATIO: 3.4
CHOLESTEROL: 224 MG/DL
CO2: 24 MMOL/L (ref 20–31)
CREAT SERPL-MCNC: 0.56 MG/DL (ref 0.5–0.9)
DIFFERENTIAL TYPE: ABNORMAL
EOSINOPHILS RELATIVE PERCENT: 2 % (ref 1–4)
GFR AFRICAN AMERICAN: >60 ML/MIN
GFR NON-AFRICAN AMERICAN: >60 ML/MIN
GFR SERPL CREATININE-BSD FRML MDRD: ABNORMAL ML/MIN/{1.73_M2}
GFR SERPL CREATININE-BSD FRML MDRD: ABNORMAL ML/MIN/{1.73_M2}
GLUCOSE BLD-MCNC: 90 MG/DL (ref 70–99)
HCT VFR BLD CALC: 44.7 % (ref 36.3–47.1)
HDLC SERPL-MCNC: 65 MG/DL
HEMOGLOBIN: 14.3 G/DL (ref 11.9–15.1)
IMMATURE GRANULOCYTES: 0 %
LDL CHOLESTEROL: 138 MG/DL (ref 0–130)
LYMPHOCYTES # BLD: 48 % (ref 24–43)
MCH RBC QN AUTO: 31.3 PG (ref 25.2–33.5)
MCHC RBC AUTO-ENTMCNC: 32 G/DL (ref 28.4–34.8)
MCV RBC AUTO: 97.8 FL (ref 82.6–102.9)
MONOCYTES # BLD: 6 % (ref 3–12)
NRBC AUTOMATED: 0 PER 100 WBC
PDW BLD-RTO: 12.3 % (ref 11.8–14.4)
PLATELET # BLD: 231 K/UL (ref 138–453)
PLATELET ESTIMATE: ABNORMAL
PMV BLD AUTO: 10.6 FL (ref 8.1–13.5)
POTASSIUM SERPL-SCNC: 4.3 MMOL/L (ref 3.7–5.3)
RBC # BLD: 4.57 M/UL (ref 3.95–5.11)
RBC # BLD: ABNORMAL 10*6/UL
SEG NEUTROPHILS: 43 % (ref 36–65)
SEGMENTED NEUTROPHILS ABSOLUTE COUNT: 2.31 K/UL (ref 1.5–8.1)
SODIUM BLD-SCNC: 139 MMOL/L (ref 135–144)
T4 TOTAL: 6.8 UG/DL (ref 4.5–12)
TOTAL PROTEIN: 6.4 G/DL (ref 6.4–8.3)
TRIGL SERPL-MCNC: 105 MG/DL
TSH SERPL DL<=0.05 MIU/L-ACNC: 1.04 MIU/L (ref 0.3–5)
VLDLC SERPL CALC-MCNC: ABNORMAL MG/DL (ref 1–30)
WBC # BLD: 5.4 K/UL (ref 3.5–11.3)
WBC # BLD: ABNORMAL 10*3/UL

## 2020-03-16 ENCOUNTER — TELEPHONE (OUTPATIENT)
Dept: FAMILY MEDICINE CLINIC | Age: 65
End: 2020-03-16

## 2020-03-16 RX ORDER — OXYCODONE HYDROCHLORIDE AND ACETAMINOPHEN 5; 325 MG/1; MG/1
1 TABLET ORAL EVERY 6 HOURS PRN
Qty: 120 TABLET | Refills: 0 | Status: SHIPPED | OUTPATIENT
Start: 2020-03-16 | End: 2020-04-16 | Stop reason: SDUPTHER

## 2020-03-16 NOTE — TELEPHONE ENCOUNTER
oarrs        Last visit: 11/18/19  Last Med refill:  2/13/20    Next Visit Date:  Future Appointments   Date Time Provider Denise Navarro   5/28/2020 10:45 AM Heike Bansal  [address] [facility]   Topic Date Due    Hepatitis C screen  1955    HIV screen  03/18/1972    DTaP/Tdap/Td vaccine (1 - Tdap) 03/18/1976    Cervical cancer screen  03/18/1978    Shingles Vaccine (1 of 2) 03/18/2007    Colon cancer screen colonoscopy  03/18/2007    Low dose CT lung screening  03/18/2012    Annual Wellness Visit (AWV)  08/22/2019    Breast cancer screen  05/29/2020 (Originally 4/22/2019)    Flu vaccine (1) 09/01/2020 (Originally 9/1/2019)    Pneumococcal 0-64 years Vaccine (1 of 1 - PPSV23) 05/29/2022 (Originally 3/18/1963)    Lipid screen  02/25/2025    Hepatitis A vaccine  Aged Out    Hepatitis B vaccine  Aged Out    Hib vaccine  Aged Out    Meningococcal (ACWY) vaccine  Aged Out       No results found for: LABA1C          ( goal A1C is < 7)   No results found for: LABMICR  LDL Cholesterol (mg/dL)   Date Value   02/25/2020 138 (H)       (goal LDL is <100)   AST (U/L)   Date Value   02/25/2020 14     ALT (U/L)   Date Value   02/25/2020 10     BUN (mg/dL)   Date Value   02/25/2020 15     BP Readings from Last 3 Encounters:   02/24/20 110/66   11/18/19 110/64   08/22/19 134/84          (goal 120/80)    All Future Testing planned in [facility]              Patient Active Problem List:     Cervical spondylosis with myelopathy

## 2020-04-16 RX ORDER — OXYCODONE HYDROCHLORIDE AND ACETAMINOPHEN 5; 325 MG/1; MG/1
1 TABLET ORAL EVERY 6 HOURS PRN
Qty: 120 TABLET | Refills: 0 | Status: SHIPPED | OUTPATIENT
Start: 2020-04-16 | End: 2020-05-19 | Stop reason: SDUPTHER

## 2020-05-19 RX ORDER — OXYCODONE HYDROCHLORIDE AND ACETAMINOPHEN 5; 325 MG/1; MG/1
1 TABLET ORAL EVERY 6 HOURS PRN
Qty: 120 TABLET | Refills: 0 | Status: SHIPPED | OUTPATIENT
Start: 2020-05-19 | End: 2020-06-18 | Stop reason: SDUPTHER

## 2020-05-28 ENCOUNTER — OFFICE VISIT (OUTPATIENT)
Dept: FAMILY MEDICINE CLINIC | Age: 65
End: 2020-05-28
Payer: COMMERCIAL

## 2020-05-28 VITALS
HEART RATE: 69 BPM | SYSTOLIC BLOOD PRESSURE: 120 MMHG | TEMPERATURE: 96.3 F | DIASTOLIC BLOOD PRESSURE: 70 MMHG | HEIGHT: 64 IN | WEIGHT: 161.8 LBS | OXYGEN SATURATION: 96 % | BODY MASS INDEX: 27.62 KG/M2

## 2020-05-28 PROCEDURE — 99213 OFFICE O/P EST LOW 20 MIN: CPT | Performed by: FAMILY MEDICINE

## 2020-05-28 RX ORDER — ESCITALOPRAM OXALATE 10 MG/1
10 TABLET ORAL DAILY
Qty: 30 TABLET | Refills: 3 | Status: SHIPPED | OUTPATIENT
Start: 2020-05-28 | End: 2020-07-23 | Stop reason: SINTOL

## 2020-05-28 SDOH — ECONOMIC STABILITY: FOOD INSECURITY: WITHIN THE PAST 12 MONTHS, YOU WORRIED THAT YOUR FOOD WOULD RUN OUT BEFORE YOU GOT MONEY TO BUY MORE.: NEVER TRUE

## 2020-05-28 SDOH — ECONOMIC STABILITY: FOOD INSECURITY: WITHIN THE PAST 12 MONTHS, THE FOOD YOU BOUGHT JUST DIDN'T LAST AND YOU DIDN'T HAVE MONEY TO GET MORE.: NEVER TRUE

## 2020-05-28 SDOH — ECONOMIC STABILITY: TRANSPORTATION INSECURITY
IN THE PAST 12 MONTHS, HAS LACK OF TRANSPORTATION KEPT YOU FROM MEETINGS, WORK, OR FROM GETTING THINGS NEEDED FOR DAILY LIVING?: NO

## 2020-05-28 SDOH — ECONOMIC STABILITY: INCOME INSECURITY: HOW HARD IS IT FOR YOU TO PAY FOR THE VERY BASICS LIKE FOOD, HOUSING, MEDICAL CARE, AND HEATING?: NOT HARD AT ALL

## 2020-05-28 SDOH — ECONOMIC STABILITY: TRANSPORTATION INSECURITY
IN THE PAST 12 MONTHS, HAS THE LACK OF TRANSPORTATION KEPT YOU FROM MEDICAL APPOINTMENTS OR FROM GETTING MEDICATIONS?: NO

## 2020-05-28 ASSESSMENT — ENCOUNTER SYMPTOMS
NAUSEA: 0
DIARRHEA: 0
SINUS PRESSURE: 0
BACK PAIN: 1
SORE THROAT: 0
SHORTNESS OF BREATH: 0
COUGH: 0
VOMITING: 0
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
## 2020-06-09 ENCOUNTER — TELEPHONE (OUTPATIENT)
Dept: FAMILY MEDICINE CLINIC | Age: 65
End: 2020-06-09

## 2020-06-16 ENCOUNTER — TELEPHONE (OUTPATIENT)
Dept: FAMILY MEDICINE CLINIC | Age: 65
End: 2020-06-16

## 2020-06-18 RX ORDER — OXYCODONE HYDROCHLORIDE AND ACETAMINOPHEN 5; 325 MG/1; MG/1
1 TABLET ORAL EVERY 6 HOURS PRN
Qty: 120 TABLET | Refills: 0 | Status: SHIPPED | OUTPATIENT
Start: 2020-06-18 | End: 2020-07-16 | Stop reason: SDUPTHER

## 2020-07-16 ENCOUNTER — TELEPHONE (OUTPATIENT)
Dept: FAMILY MEDICINE CLINIC | Age: 65
End: 2020-07-16

## 2020-07-16 RX ORDER — OXYCODONE HYDROCHLORIDE AND ACETAMINOPHEN 5; 325 MG/1; MG/1
1 TABLET ORAL EVERY 6 HOURS PRN
Qty: 120 TABLET | Refills: 0 | Status: SHIPPED | OUTPATIENT
Start: 2020-07-16 | End: 2020-08-17 | Stop reason: SDUPTHER

## 2020-07-16 NOTE — TELEPHONE ENCOUNTER
Last visit: 5/28/20  Last Med refill: 6/18/20      Next Visit Date:  Future Appointments   Date Time Provider Denise Navarro   8/31/2020 10:30 AM MD MAURICIO Monge  [facility]   10/12/2020 10:00 AM Melissa Patino APRN - [place] [facility] [place] [id number] Maintenance   Topic Date Due    Hepatitis C screen  1955    HIV screen  03/18/1972    DTaP/Tdap/Td vaccine (1 - Tdap) 03/18/1976    Cervical cancer screen  03/18/1978    Shingles Vaccine (1 of 2) 03/18/2007    Colon cancer screen colonoscopy  03/18/2007    Low dose CT lung screening  03/18/2012    Breast cancer screen  04/22/2019    Annual Wellness Visit (AWV)  08/22/2019    Pneumococcal 0-64 years Vaccine (1 of 1 - PPSV23) 05/29/2022 (Originally 3/18/1963)    Flu vaccine (1) 09/01/2020    Lipid screen  02/25/2025    Hepatitis A vaccine  Aged Out    Hepatitis B vaccine  Aged Out    Hib vaccine  Aged Out    Meningococcal (ACWY) vaccine  Aged Out       No results found for: LABA1C          ( goal A1C is < 7)   No results found for: LABMICR  LDL Cholesterol (mg/dL)   Date Value   02/25/2020 138 (H)       (goal LDL is <100)   AST (U/L)   Date Value   02/25/2020 14     ALT (U/L)   Date Value   02/25/2020 10     BUN (mg/dL)   Date Value   02/25/2020 15     BP Readings from Last 3 Encounters:   05/28/20 120/70   02/24/20 110/66   11/18/19 110/64          (goal 120/80)    All Future Testing planned in [facility]              Patient Active Problem List:     Cervical spondylosis with myelopathy

## 2020-07-23 ENCOUNTER — NURSE TRIAGE (OUTPATIENT)
Dept: OTHER | Facility: CLINIC | Age: 65
End: 2020-07-23

## 2020-07-23 ENCOUNTER — APPOINTMENT (OUTPATIENT)
Dept: GENERAL RADIOLOGY | Age: 65
End: 2020-07-23
Payer: COMMERCIAL

## 2020-07-23 ENCOUNTER — TELEPHONE (OUTPATIENT)
Dept: FAMILY MEDICINE CLINIC | Age: 65
End: 2020-07-23

## 2020-07-23 ENCOUNTER — HOSPITAL ENCOUNTER (EMERGENCY)
Age: 65
Discharge: HOME | End: 2020-07-23
Attending: EMERGENCY MEDICINE
Payer: COMMERCIAL

## 2020-07-23 VITALS
DIASTOLIC BLOOD PRESSURE: 84 MMHG | HEART RATE: 72 BPM | HEIGHT: 64 IN | BODY MASS INDEX: 27.83 KG/M2 | WEIGHT: 163 LBS | SYSTOLIC BLOOD PRESSURE: 155 MMHG | TEMPERATURE: 98.8 F | OXYGEN SATURATION: 96 % | RESPIRATION RATE: 16 BRPM

## 2020-07-23 PROCEDURE — 99283 EMERGENCY DEPT VISIT LOW MDM: CPT

## 2020-07-23 PROCEDURE — 6360000002 HC RX W HCPCS: Performed by: PHYSICIAN ASSISTANT

## 2020-07-23 PROCEDURE — 96372 THER/PROPH/DIAG INJ SC/IM: CPT

## 2020-07-23 PROCEDURE — 72100 X-RAY EXAM L-S SPINE 2/3 VWS: CPT

## 2020-07-23 RX ORDER — ORPHENADRINE CITRATE 30 MG/ML
60 INJECTION INTRAMUSCULAR; INTRAVENOUS ONCE
Status: COMPLETED | OUTPATIENT
Start: 2020-07-23 | End: 2020-07-23

## 2020-07-23 RX ORDER — MORPHINE SULFATE 4 MG/ML
4 INJECTION, SOLUTION INTRAMUSCULAR; INTRAVENOUS ONCE
Status: COMPLETED | OUTPATIENT
Start: 2020-07-23 | End: 2020-07-23

## 2020-07-23 RX ORDER — METHOCARBAMOL 750 MG/1
750 TABLET, FILM COATED ORAL 4 TIMES DAILY
Qty: 40 TABLET | Refills: 0 | Status: SHIPPED | OUTPATIENT
Start: 2020-07-23 | End: 2020-08-02

## 2020-07-23 RX ADMIN — MORPHINE SULFATE 4 MG: 4 INJECTION, SOLUTION INTRAMUSCULAR; INTRAVENOUS at 14:56

## 2020-07-23 RX ADMIN — ORPHENADRINE CITRATE 60 MG: 30 INJECTION INTRAMUSCULAR; INTRAVENOUS at 14:56

## 2020-07-23 ASSESSMENT — PAIN DESCRIPTION - PAIN TYPE: TYPE: CHRONIC PAIN

## 2020-07-23 ASSESSMENT — PAIN DESCRIPTION - ORIENTATION: ORIENTATION: LEFT

## 2020-07-23 ASSESSMENT — PAIN DESCRIPTION - DESCRIPTORS: DESCRIPTORS: ACHING

## 2020-07-23 ASSESSMENT — PAIN SCALES - GENERAL
PAINLEVEL_OUTOF10: 10
PAINLEVEL_OUTOF10: 5

## 2020-07-23 ASSESSMENT — PAIN DESCRIPTION - LOCATION: LOCATION: BACK

## 2020-07-23 ASSESSMENT — PAIN DESCRIPTION - FREQUENCY: FREQUENCY: CONTINUOUS
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
## 2020-07-23 NOTE — ED PROVIDER NOTES
The patient was seen and examined by me in conjunction with the mid-level provider. I agree with his/her assessment and treatment plan. Trays are negative per radiologist and should be treated symptomatically.       Linda Boyce MD  07/23/20 2816
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
## 2020-07-23 NOTE — ED NOTES
Patient presents to ED with c/o left lower back pain that radiates down her left leg. She states she hasn't been able [facility] without [facility] and the pain in unbearable. \" a/ox4.       Chauncey Phelps RN  07/23/20 141

## 2020-07-27 ENCOUNTER — OFFICE VISIT (OUTPATIENT)
Dept: FAMILY MEDICINE CLINIC | Age: 65
End: 2020-07-27
Payer: COMMERCIAL

## 2020-07-27 VITALS
OXYGEN SATURATION: 93 % | SYSTOLIC BLOOD PRESSURE: 120 MMHG | HEIGHT: 64 IN | WEIGHT: 153.6 LBS | TEMPERATURE: 97.4 F | DIASTOLIC BLOOD PRESSURE: 70 MMHG | HEART RATE: 63 BPM | BODY MASS INDEX: 26.22 KG/M2

## 2020-07-27 PROCEDURE — 99213 OFFICE O/P EST LOW 20 MIN: CPT | Performed by: FAMILY MEDICINE

## 2020-07-27 PROCEDURE — 1111F DSCHRG MED/CURRENT MED MERGE: CPT | Performed by: FAMILY MEDICINE

## 2020-07-27 ASSESSMENT — PATIENT HEALTH QUESTIONNAIRE - PHQ9
SUM OF ALL RESPONSES TO PHQ QUESTIONS 1-9: 0
2. FEELING DOWN, DEPRESSED OR HOPELESS: 0
SUM OF ALL RESPONSES TO PHQ QUESTIONS 1-9: 0
1. LITTLE INTEREST OR PLEASURE IN DOING THINGS: 0
SUM OF ALL RESPONSES TO PHQ9 QUESTIONS 1 & 2: 0

## 2020-07-27 ASSESSMENT — ENCOUNTER SYMPTOMS
SORE THROAT: 0
DIARRHEA: 0
SINUS PRESSURE: 0
SHORTNESS OF BREATH: 0
VOMITING: 0
BACK PAIN: 1
NAUSEA: 0
COUGH: 0
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
## 2020-08-12 ENCOUNTER — OFFICE VISIT (OUTPATIENT)
Dept: FAMILY MEDICINE CLINIC | Age: 65
End: 2020-08-12
Payer: COMMERCIAL

## 2020-08-12 VITALS
HEIGHT: 64 IN | HEART RATE: 60 BPM | DIASTOLIC BLOOD PRESSURE: 90 MMHG | OXYGEN SATURATION: 97 % | WEIGHT: 152.4 LBS | SYSTOLIC BLOOD PRESSURE: 140 MMHG | BODY MASS INDEX: 26.02 KG/M2 | TEMPERATURE: 97.6 F

## 2020-08-12 PROCEDURE — 99213 OFFICE O/P EST LOW 20 MIN: CPT | Performed by: FAMILY MEDICINE

## 2020-08-12 ASSESSMENT — ENCOUNTER SYMPTOMS
COUGH: 0
NAUSEA: 0
SINUS PRESSURE: 0
DIARRHEA: 0
BACK PAIN: 1
VOMITING: 0
SORE THROAT: 0
SHORTNESS OF BREATH: 0

## 2020-08-12 ASSESSMENT — PATIENT HEALTH QUESTIONNAIRE - PHQ9
SUM OF ALL RESPONSES TO PHQ9 QUESTIONS 1 & 2: 0
SUM OF ALL RESPONSES TO PHQ QUESTIONS 1-9: 0
2. FEELING DOWN, DEPRESSED OR HOPELESS: 0
1. LITTLE INTEREST OR PLEASURE IN DOING THINGS: 0
SUM OF ALL RESPONSES TO PHQ QUESTIONS 1-9: 0
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
## 2020-08-17 ENCOUNTER — TELEPHONE (OUTPATIENT)
Dept: FAMILY MEDICINE CLINIC | Age: 65
End: 2020-08-17

## 2020-08-17 RX ORDER — OXYCODONE HYDROCHLORIDE AND ACETAMINOPHEN 5; 325 MG/1; MG/1
1 TABLET ORAL EVERY 6 HOURS PRN
Qty: 120 TABLET | Refills: 0 | Status: SHIPPED | OUTPATIENT
Start: 2020-08-17 | End: 2020-09-16 | Stop reason: SDUPTHER

## 2020-08-17 NOTE — TELEPHONE ENCOUNTER
Durward Alpers is calling to request a refill on the following medication(s):    Medication Request:  Requested Prescriptions     Pending Prescriptions Disp Refills    oxyCODONE-acetaminophen (PERCOCET) 5-325 MG per tablet 120 tablet 0     Sig: Take 1 tablet by mouth every 6 hours as needed for Pain for up to 30 days.        Last Visit Date (If Applicable):  5/90/0366    Next Visit Date:    8/31/2020

## 2020-08-24 ENCOUNTER — HOSPITAL ENCOUNTER (OUTPATIENT)
Dept: PHYSICAL THERAPY | Facility: CLINIC | Age: 65
Setting detail: THERAPIES SERIES
Discharge: HOME | End: 2020-08-24
Payer: COMMERCIAL

## 2020-08-24 PROCEDURE — 97161 PT EVAL LOW COMPLEX 20 MIN: CPT

## 2020-08-24 NOTE — CONSULTS
Pretty Fall Risk Assessment    Patient Name:  Liliya Smith  : 1955        Risk Factor Scale  Score   History of Falls [x] Yes  [] No 25  0 25   Secondary Diagnosis [x] Yes  [] No 15  0 15   Ambulatory Aid [] Furniture  [x] Crutches/cane/walker  [] None/bedrest/wheelchair/nurse 30  15  0 15   IV/Heparin Lock [] Yes  [x] No 20  0 0   Gait/Transferring [] Impaired  [x] Weak  [] Normal/bedrest/immobile 20  10  0 10   Mental Status [] Forgets limitations  [x] Oriented to own ability 15  0 0      Total:65     Based on the Assessment score: check the appropriate box.     []  No intervention needed   Low =   Score of 0-24    []  Use standard prevention interventions Moderate =  Score of 24-44   [] Give patient handout and discuss fall prevention strategies   [] Establish goal of education for patient/family RE: fall prevention strategies    [x]  Use high risk prevention interventions High = Score of 45 and higher   [] Give patient handout and discuss fall prevention strategies   [] Establish goal of education for patient/family Re: fall prevention strategies   [] Discuss lifeline / other resources    Electronically signed by:   Michaelle Keller PT  Date: 2020

## 2020-08-24 NOTE — CONSULTS
of L1 on L, L on L3, and L3 on L4.  Findings    appear degenerative.  Mild dextroconvex curvature of the lumbar spine. Multilevel mild-to-moderate spondylosis of the lumbar spine with moderate to    severe facet arthropathy noted at the lower lumbar spine.  Bones are    osteopenic.              Impression    1. No acute fracture identified. 2. Multilevel mild-to-moderate spondylosis and moderate to severe facet    arthrosis of the lumbar spine. 3. Osteopenia.           -     [x] MRI:  Ordering Physician Provided Reason for Exam: Chronic lower back pain, pain    going into both hips. No trauma, no lower back surgery. Prior neck surgery.     Acuity: Chronic    Type of Exam: Initial         FINDINGS:    No evidence of fracture or dislocation of the lumbar spine.  Conus medullaris    is seen at the level of L1/L2 and appears unremarkable.  No evidence of nerve    root clumping.  No evidence of epidural mass or hematoma.  No prevertebral    soft tissue edema.         L1/L2: Mild circumferential disc bulge.  There is mild left neural foraminal    narrowing.  No central canal stenosis.         L2/L3: Circumferential disc bulge present results in mild effacement of    ventral thecal sac.  No central canal stenosis.  There is moderate bilateral    neural foraminal narrowing.         L3/L4: Mild circumferential disc bulge.  No central canal stenosis.  There is    moderate bilateral neural foraminal narrowing slightly worse on the right.         L4/L5: There is hypertrophy of ligamentum flavum with facet hypertrophy and    mild circumferential disc bulge resulting in mild circumferential central    canal stenosis.  There is moderate bilateral neural foraminal narrowing more    significant on the left.         L5/S1: Facet hypertrophy present.  No central canal stenosis.  There is    severe bilateral neural foraminal narrowing with effacement of exiting    bilateral L5 nerve roots.              Impression    1. Brandon Johnston [] [x] [] Mild dextro   Iliac Crest [] [] []    PSIS [] [] []    ASIS [] [] []    Genu Valgus [] [] []    Genu Varus [] [] []    Genu Recurvatum [] [] []    Pronation [] [] []    Supination [] [] []    Leg Length Discrp [] [] []    Slumped Sitting [] [] []    Palpation [] [] []    Sensation [] [] []    Edema [] [] []    Neurological [] [x] [] Hyper reflexic patellar tendon reflex B        Functional Test: Oswestry Score: 28/50  56% functional impairment     Comments: antalgic gait-uses straight cane    Assessment:   Pt would continue to benefit from skilled PT interventions to decrease pain, increase strength, ROM, and overall functional mobility for improved quality of life. Problems:    [x] ? Pain:LBP- variable 5-8/10  [x] ? ROM:flexion 75°; extension limited/painful  [x] ? Strength: weakness of core trunk muscles; L LE  [x] ? Function: Oswestry 28/50      STG: (to be met in 7 treatments)  1. ? Pain:reduce LBP to 3/10 during ADL's and gait  2. ? ROM:Lumbar flexion to 80°  3. ? Strength:initiate DLS program; strengthen Left LE musculature  4. ? Function: improve Oswestry score for walking to 1/2 mile  5. Patient to be independent with home exercise program as demonstrated by performance with correct form without cues. 6. Demonstrate Knowledge of fall prevention  LTG: (to be met in 12 treatments)  1. Improve Oswestry score for standing to 1 hour   2. Reduce LBP to 2/10       Patient goals: strengthen muscles to support back    Rehab Potential:  [x] Good  [] Fair  [] Poor   Suggested Professional Referral:  [x] No  [] Yes:  Barriers to Goal Achievement:  [x] No  [] Yes:  Domestic Concerns:  [x] No  [] Yes:    Pt. Education:  [x] Plans/Goals, Risks/Benefits discussed  [] Home exercise program  Method of Education: [x] Verbal  [] Demo  [x] Written  Comprehension of Education:  [] Verbalizes understanding. [] Demonstrates understanding. [x] Needs Review.   [] Demonstrates/verbalizes understanding of HEP/Ed previously given. Treatment Plan:  [x] Therapeutic Exercise   46724  [] Iontophoresis: 4 mg/mL Dexamethasone Sodium Phosphate  mAmin  24782   [] Therapeutic Activity  13365 [] Vasopneumatic cold with compression  68304    [] Gait Training   49034 [] Ultrasound   21956   [] Neuromuscular Re-education  06319 [] Electrical Stimulation Unattended  33073   [] Manual Therapy  67467 [] Electrical Stimulation Attended  12837   [x] Instruction in HEP  [] Lumbar/Cervical Traction  96419   [] Aquatic Therapy   60851 [x] Cold/hotpack-prn    [] Massage   35228      [] Dry Needling, 1 or 2 muscles  05337   [] Biofeedback, first 15 minutes   04065  [] Biofeedback, additional 15 minutes   47656 [] Dry Needling, 3 or more muscles  68268     []  Medication allergies reviewed for use of    Dexamethasone Sodium Phosphate 4mg/ml     with iontophoresis treatments. Pt is not allergic.     Frequency:  2 x/week for 16 visits    Todays Treatment:  Modalities:   Precautions:  Exercises: none today-issued HEP   Exercise Reps/ Time Weight/ Level Comments                                 Other:    Specific Instructions for next treatment: review HEP Ex's; start basic DLS      Evaluation Complexity:  History (Personal factors, comorbidities) [] 0 [] 1-2 [x] 3+   Exam (limitations, restrictions) [] 1-2 [x] 3 [] 4+   Clinical presentation (progression) [x] Stable [] Evolving  [] Unstable   Decision Making [x] Low [] Moderate [] High    [x] Low Complexity [] Moderate Complexity [] High Complexity       Treatment Charges: Mins Units   [x] Evaluation       [x]  Low       []  Moderate       []  High 50 1   []  Modalities     []  Ther Exercise     []  Manual Therapy     []  Ther Activities     []  Aquatics     []  Vasocompression     []  Other     Medicare cap total thru 08/24/20 $ 82.82    TOTAL TREATMENT TIME:50    Time in: 1p      Time out: 2p    Electronically signed by: Earle Hanson PT        Physician Signature:________________________________Date:__________________  By signing above or cosigning this note, I have reviewed this plan of care and certify a need for medically necessary rehabilitation services.      *PLEASE SIGN ABOVE AND FAX BACK ALL PAGES*

## 2020-08-28 ENCOUNTER — HOSPITAL ENCOUNTER (OUTPATIENT)
Dept: PHYSICAL THERAPY | Facility: CLINIC | Age: 65
Setting detail: THERAPIES SERIES
Discharge: HOME | End: 2020-08-28
Payer: COMMERCIAL

## 2020-08-28 PROCEDURE — 97110 THERAPEUTIC EXERCISES: CPT

## 2020-08-28 NOTE — FLOWSHEET NOTE
[] [facility]) - [facility] &  Therapy  [address]  [phone]  F: [phone] [x] [address]  [facility]   [address]  P: [phone]  F: [phone] [] [address] &  Therapy  [address] [facility]  P: [phone]  F: [phone] [] [address]  [facility]   [address]   [place]: [phone]  F: [phone]      Physical Therapy Daily Treatment Note  Date:  2020  Patient: Israel Kim               : 1955                      MRN: [id number]  Physician: Dr Jorden Green: Cain CARIAS / Medicare  Medical Diagnosis: Lumbar disc disease                 Rehab Codes: 54.5  Onset Date:                    Next 's appt.: 2020  Visit# / total visits:       Progress note for Medicare patient due at visit 8     Cancels/No Shows: 0/0    Subjective:    Pain:  [x] Yes  [] No Location: LB pain Pain Rating: (0-10 scale) 8/10  Pain altered Tx:  [x] No  [] Yes  Action:  Comments: Pt arrives with an antalgic gait and a single point cane. Pt reports most pain occurs when she wakes up in the moring. She then takes pain medication and that seems to help later in the day. Pt reports prolonged sitting, standing, and laying increased her pain.  Pt reports she is a 8/10 pain upon arrival.     Objective:  Modalities:  HP- LB 10min   Precautions:  Exercises:  Exercise Reps/ Time Weight/ Level Comments   Nustep  5 min            SUPINE      Pelvic tilts   10x5\"     TrA marching  20x     SKTC 5x10\"ea     LTR 10xea     Hip add 10x5\" Ball    Hip abd 20x Blue     Bridges  10x Blue           SIDELYING       Clams  10xea           SEATED      Lumbar extension  5x10\" Strap     Marches  10xea  In chair           STANDING       3 way hip- bilaterally  10xea 1#    Min squats Calf stretch  3x20\"     HS stretch  3x30ea                 Other: review HEP Ex's; start basic DLS      Treatment Charges: Mins Units   [x]  Modalities- HP 10 0   [x]  Ther Exercise 41 3   []  Manual Therapy     []  Ther Activities     []  Aquatics     []  Vasocompression     []  Other     Total Treatment time 41 3   Medicare cap total thru 08/28/20 $ 147.56    Assessment: [] Progressing toward goals. Pt reports the Nustep was a \"good\" warm up this date. Pt completed therapeutic exercises with fair tolerance secondary to muscular weakness, however has no increase of pain. Pt has increased difficulty with clams therefore resisted was held this date due to weakness. Pt required cueing during mini squats to prevent knees from valgus collapse. Pt reports a strong stretch during HS and calf stretch due to tight musculature. Ended treatment with HP to LB to reduce tension and reduce pain symptoms. [] No change. [] Other:  [] Patient would continue to benefit from skilled physical therapy services in order to: decrease pain, increase strength, ROM, and overall functional mobility for improved quality of life. STG: (to be met in 7 treatments)  1. ? Pain:reduce LBP to 3/10 during ADL's and gait  2. ? ROM:Lumbar flexion to 80°  3. ? Strength:initiate DLS program; strengthen Left LE musculature  4. ? Function: improve Oswestry score for walking to 1/2 mile  5. Patient to be independent with home exercise program as demonstrated by performance with correct form without cues. 6. Demonstrate Knowledge of fall prevention  LTG: (to be met in 12 treatments)  1. Improve Oswestry score for standing to 1 hour   2. Reduce LBP to 2/10     Pt. Education:  [x] Yes  [] No  [] Reviewed Prior HEP/Ed  Method of Education: [x] Verbal  [] Demo  [] Written  Comprehension of Education:   Yonis Stafford, back extensions- Verbal  [x] Verbalizes understanding. [] Demonstrates understanding. [x] Needs review.   [] Demonstrates/verbalizes HEP/Ed previously given. Plan: [x] Continue current frequency toward long and short term goals. [x] Specific Instructions for subsequent treatments: progress as tolerated.        Time In: 2:58pm          Time Out: 3:54pm    Electronically signed by:  Jose Ramon ESTEVES  Entirety of treatment was under direct supervision of [place], [place]

## 2020-08-31 ENCOUNTER — OFFICE VISIT (OUTPATIENT)
Dept: FAMILY MEDICINE CLINIC | Age: 65
End: 2020-08-31
Payer: COMMERCIAL

## 2020-08-31 ENCOUNTER — HOSPITAL ENCOUNTER (OUTPATIENT)
Dept: PHYSICAL THERAPY | Facility: CLINIC | Age: 65
Setting detail: THERAPIES SERIES
Discharge: HOME | End: 2020-08-31
Payer: COMMERCIAL

## 2020-08-31 VITALS
WEIGHT: 150.6 LBS | HEART RATE: 60 BPM | TEMPERATURE: 97.6 F | BODY MASS INDEX: 25.84 KG/M2 | DIASTOLIC BLOOD PRESSURE: 80 MMHG | OXYGEN SATURATION: 98 % | SYSTOLIC BLOOD PRESSURE: 126 MMHG

## 2020-08-31 PROCEDURE — 99213 OFFICE O/P EST LOW 20 MIN: CPT | Performed by: FAMILY MEDICINE

## 2020-08-31 PROCEDURE — 97110 THERAPEUTIC EXERCISES: CPT

## 2020-08-31 ASSESSMENT — ENCOUNTER SYMPTOMS
SORE THROAT: 0
DIARRHEA: 0
COUGH: 0
VOMITING: 0
SHORTNESS OF BREATH: 0
NAUSEA: 0
SINUS PRESSURE: 0
BACK PAIN: 1

## 2020-08-31 ASSESSMENT — PATIENT HEALTH QUESTIONNAIRE - PHQ9
2. FEELING DOWN, DEPRESSED OR HOPELESS: 0
SUM OF ALL RESPONSES TO PHQ QUESTIONS 1-9: 0
SUM OF ALL RESPONSES TO PHQ9 QUESTIONS 1 & 2: 0
SUM OF ALL RESPONSES TO PHQ QUESTIONS 1-9: 0
1. LITTLE INTEREST OR PLEASURE IN DOING THINGS: 0

## 2020-08-31 NOTE — FLOWSHEET NOTE
[] [facility]) - [facility] &  Therapy  [address]  [phone]  F: [phone] [x] [address]  [place] [address]   [address]  P: [phone]  F: [phone] [] [address]  Therapy  [address]  P: [phone]  F: [phone] [] [address]  [facility]   [address]   [place]: [phone]  F: [phone]      Physical Therapy Daily Treatment Note  Date:  2020  Patient: Gloria Porras               : 1955                      MRN: [id number]  Physician: Dr Nuñez Phenes: Cain CARIAS / Medicare  Medical Diagnosis: Lumbar disc disease                 Rehab Codes: 54.5  Onset Date:                    Next 's appt.: 2020  Visit# / total visits: 3/16      Progress note for Medicare patient due at visit 8     Cancels/No Shows: 0/0    Subjective:    Pain:  [] Yes  [x] No Location: LB pain Pain Rating: (0-10 scale) 0/10  Pain altered Tx:  [] No  [x] Yes  Action: Progressed exercises due to no pain. Comments: Pt reports she has no LB pain upon arrival. Pt reports she has been stretching at home and that has helped alleviate her symptoms. Pt was 30 minutes late to therapy however was able to be accommodated.    Objective:  Modalities:  HP- LB 10min   Precautions:  Exercises:  Exercise Reps/ Time Weight/ Level Comments   Nustep  5 min            SUPINE      Pelvic tilts   10x5\"     TrA marching  20x     SKTC 5x10\"ea     LTR 10xea     Hip add 20x3-5\" Ball Increased reps    Hip abd 20x Blue     Bridges  10x Blue     SLR  10xea  Added    DKTC crunch  10x  Added          SIDELYING       Clams  10xea           SEATED      Lumbar extension  10x  Standing progressed    March  10xea  In chair           STANDING       3 way hip- bilaterally 10xea 1#    Min squats    Not today 8/31   Calf stretch  3x20\"     HS stretch  3x30ea           GAIT      2 point gait pattern- step to and step through X 30ft Added 8/31, CGA  Single point cane                Other: review HEP Ex's; start basic DLS      Treatment Charges: Mins Units   [x]  Modalities- HP 10 0   [x]  Ther Exercise 51 3   []  Manual Therapy     []  Ther Activities     []  Aquatics     []  Vasocompression     []  Other     Total Treatment time 51 3   Medicare cap total thru 08/31/20 $ 212.30    Assessment: [] Progressing toward goals. Pt is able to completed all charted exercises with good tolerance and no increase in pain. Pt reports a \"good stretch\" with standing hamstring and gatroc strength. Pt reports she is unsteady during ambulation and reports \"she doesn't know if she is using the cane right\". Therefore patient was educated on 2 point pattern with cane. Pt had a slight LOB during ambulation with a heavy purse on the left side. Pt was advised to not carry heavy object during ambulation to prevent LOB. Continues to end treatment with HP to LB for pain prevention. Will add balance activities next treatment to improve stability during ambulation. [] No change. [] Other:  [] Patient would continue to benefit from skilled physical therapy services in order to: decrease pain, increase strength, ROM, and overall functional mobility for improved quality of life. STG: (to be met in 7 treatments)  1. ? Pain:reduce LBP to 3/10 during ADL's and gait  2. ? ROM:Lumbar flexion to 80°  3. ? Strength:initiate DLS program; strengthen Left LE musculature  4. ? Function: improve Oswestry score for walking to 1/2 mile  5. Patient to be independent with home exercise program as demonstrated by performance with correct form without cues. 6. Demonstrate Knowledge of fall prevention  LTG: (to be met in 12 treatments)  1. Improve Oswestry score for standing to 1 hour   2. Reduce LBP to 2/10     Pt. Education:  [x] Yes  [] No  [] Reviewed Prior HEP/Ed  Method of Education: [x] Verbal  [] Demo  [] Written  Comprehension of Education:   yue Naylor extensions- Verbal  [x] Verbalizes understanding. [] Demonstrates understanding. [x] Needs review. [] Demonstrates/verbalizes HEP/Ed previously given. Plan: [x] Continue current frequency toward long and short term goals. [x] Specific Instructions for subsequent treatments: progress as tolerated, add balance.         Time In: 1:26pm          Time Out: 2:30pm    Electronically signed by:  Alexandre ESTEVES  Entirety of treatment was under direct supervision of [place], [place]

## 2020-09-03 ENCOUNTER — HOSPITAL ENCOUNTER (OUTPATIENT)
Dept: PHYSICAL THERAPY | Facility: CLINIC | Age: 65
Setting detail: THERAPIES SERIES
Discharge: HOME | End: 2020-09-03
Payer: COMMERCIAL

## 2020-09-03 PROCEDURE — 97110 THERAPEUTIC EXERCISES: CPT

## 2020-09-08 ENCOUNTER — HOSPITAL ENCOUNTER (OUTPATIENT)
Dept: PHYSICAL THERAPY | Facility: CLINIC | Age: 65
Setting detail: THERAPIES SERIES
Discharge: HOME | End: 2020-09-08
Payer: COMMERCIAL

## 2020-09-08 PROCEDURE — 97110 THERAPEUTIC EXERCISES: CPT

## 2020-09-08 NOTE — FLOWSHEET NOTE
[] [facility]) - [facility] &  Therapy  [address]  [phone]  F: [phone] [x] [address]  [place] [address]   [address]  P: [phone]  F: [phone] [] [address]  Therapy  [address]  P: [phone]  F: [phone] [] [address]  [facility]   [address]   [place]: [phone]  F: [phone]      Physical Therapy Daily Treatment Note  Date:  2020  Patient: Tanya Alatorre               : 1955                      MRN: [id number]  Physician: Dr Singh Lighter: Cain CARIAS / Medicare  Medical Diagnosis: Lumbar disc disease                 Rehab Codes: 54.5  Onset Date:                    Next 's appt.: 2020  Visit# / total visits:       Progress note for Medicare patient due at visit 8     Cancels/No Shows: 0/0    Subjective:    Pain:  [] Yes  [x] No Location: LB pain Pain Rating: (0-10 scale) 3-4/10  Pain altered Tx:  [x] No  [] Yes  Action:     Comments: pt states she has spent much of today in bed d/t higher pain levels, however pain is greatest in her upper back/neck, rating pain 8/10.     Objective:   Modalities:  HP- LB 10min - not 20 in error  Precautions:  Exercises:  Exercise Reps/ Time Weight/ Level Comments   Nustep  6 min            PRONE       Prone lying  2x2\"  Added 9/3   Prone on elbows  2x2\"  Added 9/3    Glut sets  10x5\"   Added /3   Hip extension  5xea  Added 9/3         SUPINE      Pelvic tilts   10x5\"     TrA marching  20x     SKTC 5x10\"ea     LTR 10xea     Hip add 20x3-5\" Ball Increased reps    Hip abd 20x Blue     Bridges  10x Blue     SLR  10xea  Added    DKTC crunch  10x  Added          SIDELYING       Clams  10xea     Hip abduction  10xea AA Added 9/3         SEATED      Lumbar extension 10x  Standing progressed 8/31   Marches  10xea  In chair           STANDING       3 way hip- bilaterally no weight for extension  15xea 2# Progressed 9/3, Extension    Min squats  15x  Performed 9/3 with chair for tactile cueing    Heel raises  2x10  Added 9/3   Calf stretch  3x20\"     HS stretch  3x30ea           BALANCE       SLS  x  Attempted, too difficult    NBOS on foam eyes closed  3x30\"     Tandem stance  2x20\"ea  Added                GAIT      2 point gait pattern- step to and step through X 30ft Added 8/31, CGA  Single point cane                Other:       Treatment Charges: Mins Units   []  Modalities- HP     [x]  Ther Exercise 62 4   []  Manual Therapy     []  Ther Activities     []  Aquatics     []  Vasocompression     []  Other     Total Treatment time 62 4   Medicare cap total thru 09/8/20 $381.26    Assessment: [x] Progressing toward goals. Completed all exercises with good tolerance. Pt reports feeling improved after treatment, stating it's good to get her blood flowing. [] No change. [] Other:  [x] Patient would continue to benefit from skilled physical therapy services in order to: decrease pain, increase strength, ROM, and overall functional mobility for improved quality of life. STG: (to be met in 7 treatments)  1. ? Pain:reduce LBP to 3/10 during ADL's and gait  2. ? ROM:Lumbar flexion to 80°  3. ? Strength:initiate DLS program; strengthen Left LE musculature  4. ? Function: improve Oswestry score for walking to 1/2 mile  5. Patient to be independent with home exercise program as demonstrated by performance with correct form without cues. 6. Demonstrate Knowledge of fall prevention  LTG: (to be met in 12 treatments)  1. Improve Oswestry score for standing to 1 hour   2. Reduce LBP to 2/10     Pt.  Education:  [] Yes  [x] No  [] Reviewed Prior HEP/Ed  Method of Education: [] Verbal  [] Demo  [] Written  Comprehension of Education:   SKTC, LTR, back extensions- Verbal  [] Vani Pack

## 2020-09-10 ENCOUNTER — HOSPITAL ENCOUNTER (OUTPATIENT)
Dept: PHYSICAL THERAPY | Facility: CLINIC | Age: 65
Setting detail: THERAPIES SERIES
Discharge: HOME | End: 2020-09-10
Payer: COMMERCIAL

## 2020-09-10 PROCEDURE — 97110 THERAPEUTIC EXERCISES: CPT

## 2020-09-10 NOTE — FLOWSHEET NOTE
Standing progressed 8/31   Marches  10xea  In chair           STANDING       3 way hip- bilaterally no weight for extension  15xea 2# Progressed 9/3, Extension    Min squats  15x  Performed 9/3 with chair for tactile cueing    Heel raises  2x10 2# Added 9/3   Calf stretch  3x20\"     HS stretch  3x30ea           BALANCE       SLS  x  Attempted, too difficult    NBOS on foam eyes closed  3x30\"     Tandem stance  2x20\"ea  Added                GAIT      2 point gait pattern- step to and step through X 30ft Added 8/31, CGA  Single point cane                Other:       Treatment Charges: Mins Units   []  Modalities- HP     [x]  Ther Exercise 54 4   []  Manual Therapy     []  Ther Activities     []  Aquatics     []  Vasocompression     []  Other     Total Treatment time 54 4   Medicare cap total thru 09/10/20 $473.60    Assessment: [x] Progressing toward goals. Continued with charted exercises today with good tolerance. Pt reports no pain at the end of the session, fatigue noted. [] No change. [] Other:  [x] Patient would continue to benefit from skilled physical therapy services in order to: decrease pain, increase strength, ROM, and overall functional mobility for improved quality of life. STG: (to be met in 7 treatments)  1. ? Pain:reduce LBP to 3/10 during ADL's and gait  2. ? ROM:Lumbar flexion to 80°  3. ? Strength:initiate DLS program; strengthen Left LE musculature  4. ? Function: improve Oswestry score for walking to 1/2 mile  5. Patient to be independent with home exercise program as demonstrated by performance with correct form without cues. 6. Demonstrate Knowledge of fall prevention  LTG: (to be met in 12 treatments)  1. Improve Oswestry score for standing to 1 hour   2. Reduce LBP to 2/10     Pt.  Education:  [x] Yes  [] No  [] Reviewed Prior HEP/Ed  Method of Education: [x] Verbal for charted exercises   [] Demo  [] Written  Comprehension of Education:   SKTC, LTR, back extensions- Verbal  [x] Verbalizes understanding. [x] Demonstrates understanding. [x] Needs review. [x] Demonstrates/verbalizes HEP/Ed previously given. Plan: [x] Continue current frequency toward long and short term goals. [x] Specific Instructions for subsequent treatments: progress as tolerated, add balance.         Time In: 2:00pm             Time Out: 3:00pm    Electronically signed by:  Sandor Leal PTA

## 2020-09-14 ENCOUNTER — HOSPITAL ENCOUNTER (OUTPATIENT)
Dept: PHYSICAL THERAPY | Facility: CLINIC | Age: 65
Setting detail: THERAPIES SERIES
Discharge: HOME | End: 2020-09-14
Payer: COMMERCIAL

## 2020-09-14 PROCEDURE — 97110 THERAPEUTIC EXERCISES: CPT

## 2020-09-14 NOTE — DISCHARGE SUMMARY
[] [address]  [address]  [phone]  F: [phone] [x] [address]  [facility]   [address]  P: [phone]  F: [phone] [] [address]  [address] [facility]  P: [phone]  F: [phone] [] [address]  [address] [facility] [address]   [address]   [place]: [phone]  F: [phone]      Physical Therapy Discharge Note    Date: 2020      Patient: Lin Watkins  : 1955  MRN: [id number]    Physician: Dr Burns                          Insurance: [facility] / [facility] Percy disc disease                 Rehab Codes: 54.5  Onset Date:                    Next 's appt. : tbd  Visit# / total visits:       Progress note for Medicare patient due at visit 8                                  Cancels/No Shows: 0/0  Date of initial visit: 2020                Date of final visit: 2020      Subjective:    Pain:  [x]? Yes  []? No   Location: LB pain        Pain Rating: (0-10 scale) 3/10  Pain altered Tx:  [x]? No  []? Yes  Action:      Comments: pt reports her LBP is 3/10 she notes difficulty sleeping:    Objective:  Test Measurements/Function: Pt noted R hip soreness when completing standing exercises; she informs me at the end of the session that she does not want to schedule any additional visits at this time secondary to her high co-pay. I explained that she could discuss with  as we offer financial assistance but she declined to discuss at this time. She was provided additional home exercises.  Oswestry score: 50    Assessment:  STG: (to be met in 7 treatments) recheck   1. ? Pain:reduce LBP to 3/10 during ADL's and gait goal met-LBP intermittent and currently 3/10  2. ? ROM:Lumbar flexion to 80° not met  3. ? Strength:initiate DLS program; strengthen Left LE musculature progress made-goal ongoing  4. ? Function: improve Oswestry score for walking to 1/2 mile not met  5. Patient to be independent with home exercise program as demonstrated by performance with correct form without cues. Goal met  6. Demonstrate Knowledge of fall prevention  LTG: (to be met in 12 treatments)  1. Improve Oswestry score for standing to 1 hour   2. Reduce LBP to 2/10     Treatment to Date:  [x] Therapeutic Exercise    [] Modalities:  [] Therapeutic Activity              [] Ultrasound  [] Electrical Stimulation  [] Gait Training     [] Massage       [] Lumbar/Cervical Traction  [] Neuromuscular Re-education [x] Cold/hotpack [] Iontophoresis: 4 mg/mL  [x] Instruction in Home Exercise Program                     Dexamethasone Sodium  [] Manual Therapy             Phosphate 40-80 mAmin  [] Aquatic Therapy                   [] Vasocompression/    [] Other:             Game Ready    Discharge Status:        [x] Pt to continue exercise/home instructions independently    [x] Other: Discharge per Pt request        Electronically signed by Yovani Sethi PT on 9/14/2020 at 1:02 PM      If you have any questions or concerns, please don't hesitate to call.   Thank you for your referral.

## 2020-09-14 NOTE — FLOWSHEET NOTE
[] [address] [facility]  [address]  [phone]  F: [phone] [x] [address]  [facility] [place] [facility]   [address]  P: [phone]  F: [phone] [] [address] [facility]  [facility]  [address] [facility]  P: [phone]  F: [phone] [] [address]  [facility]   [address]   [place]: [phone]  F: [phone]      Physical Therapy Daily Treatment Note  Date:  9/10/2020  Patient: Yaneli Alcantara               : 1955                      MRN: [id number]  Physician: Dr Evert Caballero: [employer] / Medicare  Medical Diagnosis: Lumbar disc disease                 Rehab Codes: 54.5  Onset Date:                    Next 's appt. : tbd  Visit# / total visits:       Progress note for Medicare patient due at visit 8     Cancels/No Shows: 0/0    Subjective:    Pain:  [x] Yes  [] No Location: LB pain Pain Rating: (0-10 scale) 3/10  Pain altered Tx:  [x] No  [] Yes  Action:     Comments: pt reports her LBP is 3/10 she notes difficulty sleeping    Objective:   Modalities: pt declined 9/10 HP- LB 10min  Precautions:  Exercises:  Exercise Reps/ Time Weight/ Level Comments   Nustep  6 min            PRONE       Prone lying  2x2\"  Added 9/3   Prone on elbows  2x2\"  Added 9/3    Glut sets  10x5\"   Added 9/3   Hip extension  6xea  Added 9/3         SUPINE      Pelvic tilts   10x5\"     TrA marching  20x     SKTC 5x10\"ea     LTR 10xea     Hip add 20x3-5\" Ball Increased reps    Hip abd 20x Blue     Bridges  10x Blue     SLR  10xea  Added    DKTC crunch  10x  Added          SIDELYING       Clams  10xea     Hip abduction  10xea AA Added 9/3         SEATED      Lumbar extension  10x  Standing progressed    Marches  10xea  In chair           STANDING       3 way hip- bilaterally no weight for extension  15xea 2# Progressed 9/3, Extension    Min squats  15x  Performed 9/3 with chair for tactile cueing    Heel raises  x15 2# Added 9/3   Calf stretch  3x20\"     HS stretch  3x30ea           BALANCE             NBOS on foam eyes closed  2x25\"     Tandem stance  2x20\"ea  Added                GAIT      2 point gait pattern- step to and step through X 30ft Added 8/31, CGA  Single point cane- HELD 9/14                Other:       Treatment Charges: Mins Units   []  Modalities- HP     [x]  Ther Exercise 45 3   []  Manual Therapy     []  Ther Activities     []  Aquatics     []  Vasocompression     []  Other     Total Treatment time 45 3   Medicare cap total thru 09/14/20 $549.76    Assessment: [x] Progressing toward goals. Pt noted R hip soreness when completing standing exercises; she informs me at the end of the session that she does not want to schedule any additional visits at this time secondary to her high co-pay. I explained that she could discuss with  as we offer financial assistance but she declined to discuss at this time. She was provided additional home exercises. Oswestry score 19/50     No change. [] Other:  [x] Patient would continue to benefit from skilled physical therapy services in order to: decrease pain, increase strength, ROM, and overall functional mobility for improved quality of life. STG: (to be met in 7 treatments) recheck 9/14  1. ? Pain:reduce LBP to 3/10 during ADL's and gait goal met-LBP intermittent and currently 3/10  2. ? ROM:Lumbar flexion to 80° not met  3. ? Strength:initiate DLS program; strengthen Left LE musculature progress made-goal ongoing  4. ? Function: improve Oswestry score for walking to 1/2 mile not met  5. Patient to be independent with home exercise program as demonstrated by performance with correct form without cues. Goal met  6. Demonstrate Knowledge of fall prevention  LTG: (to be met in 12 treatments)  1.  Improve Oswestry score for standing to 1 hour goal met  2. Reduce LBP to 2/10     Pt. Education:  [x] Yes  [] No  [] Reviewed Prior HEP/Ed  Method of Education: [x] Verbal for charted exercises   [] Demo  [] Written  Comprehension of Education:   Jessika Gonzalez, back extensions- Verbal  [x] Verbalizes understanding. [x] Demonstrates understanding. [x] Needs review. [x] Demonstrates/verbalizes HEP/Ed previously given. Plan: [x] Discharge from  PT-Pt wishes to continue with home exercises    [] Specific Instructions for subsequent treatments: progress as tolerated, add balance.         Time In: 11a           Time Out: 11:55a    Electronically signed by:  Dustin Lai PT

## 2020-09-16 RX ORDER — OXYCODONE HYDROCHLORIDE AND ACETAMINOPHEN 5; 325 MG/1; MG/1
1 TABLET ORAL EVERY 6 HOURS PRN
Qty: 120 TABLET | Refills: 0 | OUTPATIENT
Start: 2020-09-16 | End: 2020-10-16

## 2020-09-16 RX ORDER — OXYCODONE HYDROCHLORIDE AND ACETAMINOPHEN 5; 325 MG/1; MG/1
1 TABLET ORAL EVERY 6 HOURS PRN
Qty: 120 TABLET | Refills: 0 | Status: SHIPPED | OUTPATIENT
Start: 2020-09-16 | End: 2020-10-16 | Stop reason: SDUPTHER

## 2020-09-16 NOTE — TELEPHONE ENCOUNTER
Bandar Hooker is calling to request a refill on the following medication(s):    Medication Request:  Requested Prescriptions     Pending Prescriptions Disp Refills    oxyCODONE-acetaminophen (PERCOCET) 5-325 MG per tablet 120 tablet 0     Sig: Take 1 tablet by mouth every 6 hours as needed for Pain for up to 30 days. Refused Prescriptions Disp Refills    oxyCODONE-acetaminophen (PERCOCET) 5-325 MG per tablet 120 tablet 0     Sig: Take 1 tablet by mouth every 6 hours as needed for Pain for up to 30 days.      Refused By: Sadiq Gregory     Reason for Refusal: Duplicate Request       Last Visit Date (If Applicable):  6/24/6199    Next Visit Date:    Visit date not found      Last Refill:  8/17/2020

## 2020-09-16 NOTE — TELEPHONE ENCOUNTER
Pt requested refill for pending medication.      Next OV: 10/12  Past OV: 8/31    Pt contact: [phone]

## 2020-10-12 PROBLEM — M54.9 CHRONIC BACK PAIN: Status: ACTIVE | Noted: 2017-07-04

## 2020-10-12 PROBLEM — Z79.891 LONG TERM (CURRENT) USE OF OPIATE ANALGESIC: Status: ACTIVE | Noted: 2020-10-12

## 2020-10-12 PROBLEM — M47.812 CERVICAL SPONDYLOSIS WITHOUT MYELOPATHY: Status: ACTIVE | Noted: 2020-10-12

## 2020-10-12 PROBLEM — H93.19 TINNITUS: Status: ACTIVE | Noted: 2017-07-04

## 2020-10-12 PROBLEM — K59.00 CONSTIPATION: Status: ACTIVE | Noted: 2017-07-04

## 2020-10-12 PROBLEM — M19.90 ARTHRITIS: Status: ACTIVE | Noted: 2017-07-04

## 2020-10-12 PROBLEM — I10 ESSENTIAL HYPERTENSION: Status: ACTIVE | Noted: 2017-07-27

## 2020-10-12 PROBLEM — G82.50 QUADRIPLEGIA (HCC): Status: ACTIVE | Noted: 2020-10-12

## 2020-10-12 PROBLEM — G95.9 CERVICAL MYELOPATHY (HCC): Status: ACTIVE | Noted: 2020-10-12

## 2020-10-12 PROBLEM — R56.9 SEIZURES (HCC): Status: ACTIVE | Noted: 2017-07-04

## 2020-10-12 PROBLEM — H90.3 SENSORINEURAL HEARING LOSS (SNHL), BILATERAL: Status: ACTIVE | Noted: 2017-07-31

## 2020-10-12 PROBLEM — F17.210 NICOTINE DEPENDENCE, CIGARETTES, UNCOMPLICATED: Status: ACTIVE | Noted: 2017-07-27

## 2020-10-12 PROBLEM — G89.29 CHRONIC BACK PAIN: Status: ACTIVE | Noted: 2017-07-04

## 2020-10-12 PROBLEM — F41.9 ANXIETY: Status: ACTIVE | Noted: 2017-07-04

## 2020-10-12 PROBLEM — E55.9 VITAMIN D DEFICIENCY: Status: ACTIVE | Noted: 2017-07-04

## 2020-10-16 RX ORDER — OXYCODONE HYDROCHLORIDE AND ACETAMINOPHEN 5; 325 MG/1; MG/1
1 TABLET ORAL EVERY 6 HOURS PRN
Qty: 120 TABLET | Refills: 0 | Status: SHIPPED | OUTPATIENT
Start: 2020-10-16 | End: 2020-11-13 | Stop reason: SDUPTHER

## 2020-11-13 RX ORDER — OXYCODONE HYDROCHLORIDE AND ACETAMINOPHEN 5; 325 MG/1; MG/1
1 TABLET ORAL EVERY 6 HOURS PRN
Qty: 120 TABLET | Refills: 0 | Status: SHIPPED | OUTPATIENT
Start: 2020-11-13 | End: 2020-12-09 | Stop reason: SDUPTHER

## 2020-11-13 NOTE — TELEPHONE ENCOUNTER
LOV: 8/31/2020  NOV: 11/30/2020      [id number] [address] [place] [address] Koko Rosenbaum 73 - F [phone]

## 2020-11-13 NOTE — TELEPHONE ENCOUNTER
Russ Shetty is calling to request a refill on the following medication(s):    Medication Request:  Requested Prescriptions     Pending Prescriptions Disp Refills    oxyCODONE-acetaminophen (PERCOCET) 5-325 MG per tablet 120 tablet 0     Sig: Take 1 tablet by mouth every 6 hours as needed for Pain for up to 30 days.        Last Visit Date (If Applicable):  1/98/1602    Next Visit Date:    Visit date not found      Last Refill:  10/16/2020

## 2020-12-08 ENCOUNTER — TELEPHONE (OUTPATIENT)
Dept: FAMILY MEDICINE CLINIC | Age: 65
End: 2020-12-08

## 2020-12-09 ENCOUNTER — HOSPITAL ENCOUNTER (OUTPATIENT)
Age: 65
Setting detail: SPECIMEN
Discharge: HOME | End: 2020-12-09
Payer: COMMERCIAL

## 2020-12-09 ENCOUNTER — OFFICE VISIT (OUTPATIENT)
Dept: FAMILY MEDICINE CLINIC | Age: 65
End: 2020-12-09
Payer: COMMERCIAL

## 2020-12-09 VITALS
HEART RATE: 57 BPM | DIASTOLIC BLOOD PRESSURE: 70 MMHG | OXYGEN SATURATION: 98 % | WEIGHT: 148.6 LBS | TEMPERATURE: 97.4 F | SYSTOLIC BLOOD PRESSURE: 124 MMHG | BODY MASS INDEX: 25.49 KG/M2

## 2020-12-09 PROBLEM — K59.00 CONSTIPATION: Status: RESOLVED | Noted: 2017-07-04 | Resolved: 2020-12-09

## 2020-12-09 PROCEDURE — 99213 OFFICE O/P EST LOW 20 MIN: CPT | Performed by: FAMILY MEDICINE

## 2020-12-09 RX ORDER — OXYCODONE HYDROCHLORIDE AND ACETAMINOPHEN 5; 325 MG/1; MG/1
1 TABLET ORAL EVERY 6 HOURS PRN
Qty: 120 TABLET | Refills: 0 | Status: SHIPPED | OUTPATIENT
Start: 2020-12-09 | End: 2021-03-04 | Stop reason: SDUPTHER

## 2020-12-09 ASSESSMENT — PATIENT HEALTH QUESTIONNAIRE - PHQ9
SUM OF ALL RESPONSES TO PHQ9 QUESTIONS 1 & 2: 0
SUM OF ALL RESPONSES TO PHQ QUESTIONS 1-9: 0
SUM OF ALL RESPONSES TO PHQ QUESTIONS 1-9: 0
2. FEELING DOWN, DEPRESSED OR HOPELESS: 0
SUM OF ALL RESPONSES TO PHQ QUESTIONS 1-9: 0
1. LITTLE INTEREST OR PLEASURE IN DOING THINGS: 0

## 2020-12-09 ASSESSMENT — ENCOUNTER SYMPTOMS
TROUBLE SWALLOWING: 0
VISUAL CHANGE: 0
PHOTOPHOBIA: 0

## 2020-12-09 NOTE — PATIENT INSTRUCTIONS
Patient Education        Preventing Falls: Care Instructions  Your Care Instructions     Getting around your home safely can be a challenge if you have injuries or health problems that make it easy for you to fall. Loose rugs and furniture in walkways are among the dangers for many older people who have problems walking or who have poor eyesight. People who have conditions such as arthritis, osteoporosis, or dementia also have to be careful not to fall. You can make your home safer with a few simple measures. Follow-up care is a key part of your treatment and safety. Be sure to make and go to all appointments, and call your doctor if you are having problems. It's also a good idea to know your test results and keep a list of the medicines you take. How can you care for yourself at home? Taking care of yourself  · You may get dizzy if you do not drink enough water. To prevent dehydration, drink plenty of fluids, enough so that your urine is light yellow or clear like water. Choose water and other caffeine-free clear liquids. If you have kidney, heart, or liver disease and have to limit fluids, talk with your doctor before you increase the amount of fluids you drink. · Exercise regularly to improve your strength, muscle tone, and balance. Walk if you can. Swimming may be a good choice if you cannot walk easily. · Have your vision and hearing checked each year or any time you notice a change. If you have trouble seeing and hearing, you might not be able to avoid objects and could lose your balance. · Know the side effects of the medicines you take. Ask your doctor or pharmacist whether the medicines you take can affect your balance. Sleeping pills or sedatives can affect your balance. · Limit the amount of alcohol you drink. Alcohol can impair your balance and other senses. · Ask your doctor whether calluses or corns on your feet need to be removed.  If you wear loose-fitting shoes because of calluses or corns, you can lose your balance and fall. · Talk to your doctor if you have numbness in your feet. Preventing falls at home  · Remove raised doorway thresholds, throw rugs, and clutter. Repair loose carpet or raised areas in the floor. · Move furniture and electrical cords to keep them out of walking paths. · Use nonskid floor wax, and wipe up spills right away, especially on ceramic tile floors. · If you use a walker or cane, put rubber tips on it. If you use crutches, clean the bottoms of them regularly with an abrasive pad, such as steel wool. · Keep your house well lit, especially [place], and outside walkways. Use night-lights in areas such as hallways and bathrooms. Add extra light switches or use remote switches (such as switches that go on or off when you clap your hands) to make it easier to turn lights on if you have to get up during the night. · Install sturdy handrails on stairways. · Move items in your cabinets so that the things you use a lot are on the lower shelves (about waist level). · Keep a cordless phone and a flashlight with new batteries by your bed. If possible, put a phone in each of the main rooms of your house, or carry a cell phone in case you fall and cannot reach a phone. Or, you can wear a device around your neck or wrist. You push a button that sends a signal for help. · Wear low-heeled shoes that fit well and give your feet good support. Use footwear with nonskid soles. Check the heels and soles of your shoes for wear. Repair or replace worn heels or soles. · Do not wear socks without shoes on wood floors. · Walk on the grass when the sidewalks are slippery. If you live in an area that gets snow and ice in the winter, sprinkle salt on slippery steps and sidewalks. Preventing falls in the bath  · Install grab bars and nonskid mats inside and outside your shower or tub and near the toilet and sinks. · Use shower chairs and bath benches.   · Use a hand-held shower head that will allow you to sit while showering. · Get into a tub or shower by putting the weaker leg in first. Get out of a tub or shower with your strong side first.  · Repair loose toilet seats and consider installing a raised toilet seat to make getting on and off the toilet easier. · Keep your bathroom door unlocked while you are in the shower. Where can you learn more? Go to https://Problemsolutions24pepiclifeaction gameseb.Cargo.io. org and sign in to your [employer] account. Enter [phone] in the [employer] box to learn more about \"Preventing Falls: Care Instructions. \"     If you do not have an account, please click on the \"Sign Up Now\" link. Current as of: April 15, 2020               Content Version: 12.6  © 5129-4830 [employer]. Care instructions adapted under license by [facility] [place] ([facility]). If you have questions about a medical condition or this instruction, always ask your healthcare professional. [address] any warranty or liability for your use of this information.

## 2020-12-09 NOTE — ASSESSMENT & PLAN NOTE
Pain controlled on Percocet 5mg q6h prn  Safe level  No red flags on PDMP  Refill given for one month  Medication contract signed and UDS done

## 2020-12-09 NOTE — LETTER
CONTROLLED SUBSTANCE MEDICATION AGREEMENT     Patient Name: Amanda Gonzalez  Patient YOB: 1955   I understand, that controlled substance medications may be used to help better manage my symptoms and to improve my ability to function at home, work and in social settings. However, I also understand that these medications do have risks, which have been discussed with me, including possible development of physical or psychological dependence. I understand that successful treatment requires mutual trust and honesty between me and my provider. I understand and agree that following this Medication Agreement is necessary in continuing my provider-patient relationship and the success of my treatment plan. Explanation from my Provider: Benefits and Goals of Controlled Substance Medications: There are two potential goals for your treatment: (1) decreased pain and suffering (2) improved daily life functions. There are many possible treatments for your chronic condition(s). Alternatives such as physical therapy, yoga, massage, home daily exercise, meditation, relaxation techniques, injections, chiropractic manipulations, surgery, cognitive therapy, hypnosis and many medications that are not habit-forming may be used. Use of controlled substance medications may be helpful, but they are unlikely to resolve all symptoms or restore all function. Explanation from my Provider: Risks of Controlled Substance Medications:  Opioid pain medications: These medications can lead to problems such as addiction/dependence, sedation, lightheadedness/dizziness, memory issues, falls, constipation, nausea, or vomiting. They may also impair the ability to drive or operate machinery. Additionally, these medications may lower testosterone levels, leading to loss of bone strength, stamina and sex drive.   They may cause problems with breathing, sleep apnea and reduced coughing, which is especially dangerous for patients with lung disease. Overdose or dangerous interactions with alcohol and other medications may occur, leading to death. Hyperalgesia may develop, which means patients receiving opioids for the treatment of pain may become more sensitive to certain painful stimuli, and in some cases, experience pain from ordinarily non-painful stimuli. Women between the ages of 14-53 who could become pregnant should carefully weigh the risks and benefits of opioids with their physicians, as these medications increase the risk of pregnancy complications, including miscarriage,  delivery and stillbirth. It is also possible for babies to be born addicted to opioids. Opioid dependence withdrawal symptoms may include; feelings of uneasiness, increased pain, irritability, belly pain, diarrhea, sweats and goose-flesh. Benzodiazepines and non-benzodiazepine sleep medications: These medications can lead to problems such as addiction/dependence, sedation, fatigue, lightheadedness, dizziness, incoordination, falls, depression, hallucinations, and impaired judgment, memory and concentration. The ability to drive and operate machinery may also be affected. Abnormal sleep-related behaviors have been reported, including sleepwalking, driving, making telephone calls, eating, or having sex while not fully awake. These medications can suppress breathing and worsen sleep apnea, particularly when combined with alcohol or other sedating medications, potentially leading to death. Dependence withdrawal symptoms may include tremors, anxiety, hallucinations and seizures.   Stimulants:  Common adverse effects include addiction/dependence, increased blood  pressure and heart rate, decreased appetite, nausea, involuntary weight loss, insomnia,                                                                                                                     Initials:_______ irritability, and headaches. These risks may increase when these medications are combined with other stimulants, such as caffeine pills or energy drinks, certain weight loss supplements and oral decongestants. Dependence withdrawal symptoms may include depressed mood, loss of interest, suicidal thoughts, anxiety, fatigue, appetite changes and agitation. Testosterone replacement therapy:  Potential side effects include increased risk of stroke and heart attack, blood clots, increased blood pressure, increased cholesterol, enlarged prostate, sleep apnea, irritability/aggression and other mood disorders, and decreased fertility. I agree and understand that I and my prescriber have the following rights and responsibilities regarding my treatment plan:     1. MY RIGHTS:  To be informed of my treatment and medication plan. To be an active participant in my health and wellbeing. 2. MY RESPONSIBILITY AND UNDERSTANDING FOR USE OF MEDICATIONS  ? I will take medications at the dose and frequency as directed. For my safety, I will not increase or change how I take my medications without the recommendation of my healthcare provider. ? I will actively participate in any program recommended by my provider which may improve function, including social, physical, psychological programs. ? I will not take my medications with alcohol or other drugs not prescribed to me. I understand that drinking alcohol with my medications increases the chances of side effects, including reduced breathing rate and could lead to personal injury when operating machinery. ? I understand that if I have a history of substance use disorders, including alcohol or other illicit drugs, that I may be at increased risk of addiction to my medications. ? I agree to notify my provider immediately if I should become pregnant so that my treatment plan can be adjusted.   ? I agree and understand that I shall only receive controlled substance medications from the prescriber that signed this agreement unless there is written agreement among other prescribers of controlled substances outlining the responsibility of the medications being prescribed. ? I understand that the if the controlled medication is not helping to achieve goals, the dosage may be tapered and no longer prescribed. 3. MY RESPONSIBILITY FOR COMMUNICATION / PRESCRIPTION RENEWALS  ? I agree that all controlled substance medications that I take will be prescribed only by my provider. If another healthcare provider prescribes me medication in an emergency, I will notify my provider within seventy-two (72) hours. ? I will arrange for refills at the prescribed interval ONLY during regular office hours. I will not ask for refills earlier than agreed, after-hours, on holidays or weekends. Refills may take up to 72 hours for processing and prescriptions to reach the pharmacy. ? I will inform my other health care providers that I am taking these medications and of the existence of this [address]. In the event of an emergency, I will provide the same information to the emergency department prescribers. ? I will keep my provider updated on the pharmacy I am using for controlled medication prescription filling. Initials:_______  4. MY RESPONSIBILITY FOR PROTECTING MEDICATIONS  ? I will protect my prescriptions and medications. I understand that lost or misplaced prescriptions will not be replaced. ? I will keep medications only for my own use and will not share them with others. I will keep all medications away from children. ? I agree that if my medications are adjusted or discontinued, I will properly dispose of any remaining medications. I understand that I will be required to dispose of any remaining controlled medications as, directed by my prescriber, prior to being provided with any prescriptions for other controlled medications.   Medication drop box locations can be found at: HitProtect.dk    5. MY RESPONSIBILITY WITH ILLEGAL DRUGS   ? I will not use illegal or street drugs or another person's prescription medications not prescribed to me.   ? If there are identified addiction type symptoms, then referral to a program may be provided by my provider and I agree to follow through with this recommendation. 6. MY RESPONSIBILITY FOR COOPERATION WITH INVESTIGATIONS  ? I understand that my provider will comply with any applicable law and may discuss my use and/or possible misuse/abuse of controlled substances and alcohol, as appropriate, with any health care provider involved in my care, pharmacist, or legal authority. ? I authorize my provider and pharmacy to cooperate fully with law enforcement agencies (as permitted by law) in the investigation of any possible misuse, sale, or other diversion of my controlled substances. ? I agree to waive any applicable privilege or right of privacy or confidentiality with respect to these authorizations. 7. PROVIDERS RIGHT TO MONITOR FOR SAFETY: PRESCRIPTION MONITORING / DRUG TESTING  ? I consent to drug/toxicology screening and will submit to a drug screen upon my providers request to assure I am only taking the prescribed drugs for my safety monitoring. I understand that a drug screen is a laboratory test in which a sample of my urine, blood or saliva is checked to see what drugs I have been taking. This may entail an observed urine specimen, which means that a nurse or other health care provider may watch me provide urine, and I will cooperate if I am asked to provide an observed specimen. ? I understand that my provider will check a copy of my State Prescription Monitoring Program () Report in order to safely prescribe medications. ? Pill Counts: I consent to pill counts when requested.   I may be asked to I further agree to allow this office to contact my HIPAA contact if there are concerns about my safety and use of the controlled medications. I have agreed to use the prescribed controlled substance medications to me as instructed by my provider and as stated in this Medication Agreement. My initial on each page and my signature below shows that I have read each page and I have had the opportunity to ask questions with answers provided by my provider.     Patient Name (Printed): _____________________________________  Patient Signature:  ______________________   Date: _____________    Prescriber Name (Printed): ___________________________________  Prescriber Signature: _____________________  Date: _____________

## 2020-12-09 NOTE — PROGRESS NOTES
symptoms comments: Burning pain in arm. She has tried oral narcotics for the symptoms. The treatment provided significant relief. Review of Systems   Review of Systems   Constitutional: Negative for fever and weight loss. HENT: Negative for trouble swallowing. Eyes: Negative for photophobia. Cardiovascular: Negative for chest pain and syncope. Musculoskeletal: Positive for neck pain. Neurological: Positive for weakness. Negative for tingling, numbness and headaches. All other systems reviewed and are negative. Medications     Current Outpatient Medications   Medication Sig Dispense Refill    oxyCODONE-acetaminophen (PERCOCET) 5-325 MG per tablet Take 1 tablet by mouth every 6 hours as needed for Pain for up to 30 days. 120 tablet 0     No current facility-administered medications for this visit. Past History    Past Medical History:   has a past medical history of Arthritis, Cervical disc disorder, and Murmur. Social History:   reports that she has been smoking cigarettes. She started smoking about 36 years ago. She has a 30.00 pack-year smoking history. She uses smokeless tobacco. She reports current alcohol use. She reports that she does not use drugs. Family History:   Family History   Problem Relation Age of Onset    Diabetes Mother     Diabetes Father     Stroke Father     Hypertension Father     Diabetes Maternal Grandmother        Surgical History:   Past Surgical History:   Procedure Laterality Date    ANKLE SURGERY Left     ORIF-hardware removed    CERVICAL LAMINECTOMY  8/1/14    C3 - C7    HERNIA REPAIR      TUBAL LIGATION Bilateral         Physical Examination      Vitals:  /70   Pulse 57   Temp 97.4 °F (36.3 °C) (Temporal)   Wt 148 lb 9.6 oz (67.4 kg)   SpO2 98%   BMI 25.49 kg/m²     Physical Exam  Vitals signs and nursing note reviewed. Constitutional:       General: She is not in acute distress. Appearance: Normal appearance.  She is normal weight. She is not ill-appearing, toxic-appearing or diaphoretic. HENT:      Head: Normocephalic and atraumatic. Eyes:      General: No scleral icterus. Right eye: No discharge. Left eye: No discharge. Extraocular Movements: Extraocular movements intact. Conjunctiva/sclera: Conjunctivae normal.   Cardiovascular:      Rate and Rhythm: Normal rate and regular rhythm. Pulses: Normal pulses. Heart sounds: Normal heart sounds. No murmur. No friction rub. No gallop. Pulmonary:      Effort: Pulmonary effort is normal. No respiratory distress. Breath sounds: Normal breath sounds. No stridor. No wheezing, rhonchi or rales. Chest:      Chest wall: No tenderness. Neurological:      Mental Status: She is alert and oriented to person, place, and time. Mental status is at baseline. Motor: Weakness present. Coordination: Coordination abnormal.      Gait: Gait abnormal.   Psychiatric:         Mood and Affect: Mood normal.         Behavior: Behavior normal.         Thought Content: Thought content normal.         Judgment: Judgment normal.         Labs/Imaging/Diagnostics   Labs:  No results found for: CMPWITHGFR, CBCAUTODIF, TSHFT4, LABA1C, LIPIDPAN    Imaging Last 24 Hours:  XR LUMBAR SPINE (2-3 VIEWS)  Narrative: EXAMINATION:  THREE XRAY VIEWS OF THE LUMBAR SPINE    7/23/2020 2:49 pm    COMPARISON:  MRI lumbar spine November 6, 2018    HISTORY:  ORDERING SYSTEM PROVIDED HISTORY: pain  TECHNOLOGIST PROVIDED HISTORY:  pain  Reason for Exam: pain in lower back over the last week, worse over last 2  days. history of herniated disk but she cannot remember which level  Acuity: Unknown  Type of Exam: Unknown    FINDINGS:  Three views of the lumbar spine demonstrate no acute fracture or dislocation.   Alignment appears grossly stable compared with comparison MRI from November 6, 2018 with similar appearance of mild grade 1 anterolisthesis of L4 on L5  and mild grade 1 retrolisthesis of L1 on L, L on L3, and L3 on L4. Findings  appear degenerative. Mild dextroconvex curvature of the lumbar spine. Multilevel mild-to-moderate spondylosis of the lumbar spine with moderate to  severe facet arthropathy noted at the lower lumbar spine. Bones are  osteopenic. Impression: 1. No acute fracture identified. 2. Multilevel mild-to-moderate spondylosis and moderate to severe facet  arthrosis of the lumbar spine. 3. Osteopenia.

## 2020-12-13 LAB
6-ACETYLMORPHINE, UR: NOT DETECTED
7-AMINOCLONAZEPAM, URINE: NOT DETECTED
ALPHA-OH-ALPRAZ, URINE: NOT DETECTED
ALPRAZOLAM, URINE: NOT DETECTED
AMPHETAMINES, URINE: NOT DETECTED
BARBITURATES, URINE: NOT DETECTED
BENZOYLECGONINE, UR: NOT DETECTED
BUPRENORPHINE URINE: NOT DETECTED
CARISOPRODOL, UR: NOT DETECTED
CLONAZEPAM, URINE: NOT DETECTED
CODEINE, URINE: NOT DETECTED
CREATININE URINE: 142.8 MG/DL (ref 20–400)
DIAZEPAM, URINE: NOT DETECTED
DRUGS EXPECTED, UR: NORMAL
EER HI RES INTERP UR: NORMAL
ETHYL GLUCURONIDE UR: NOT DETECTED
FENTANYL URINE: NOT DETECTED
HYDROCODONE, URINE: NOT DETECTED
HYDROMORPHONE, URINE: NOT DETECTED
LORAZEPAM, URINE: NOT DETECTED
MARIJUANA METAB, UR: PRESENT
MDA, UR: NOT DETECTED
MDEA, EVE, UR: NOT DETECTED
MDMA URINE: NOT DETECTED
MEPERIDINE METAB, UR: NOT DETECTED
METHADONE, URINE: NOT DETECTED
METHAMPHETAMINE, URINE: NOT DETECTED
METHYLPHENIDATE: NOT DETECTED
MIDAZOLAM, URINE: NOT DETECTED
MORPHINE URINE: NOT DETECTED
NORBUPRENORPHINE, URINE: NOT DETECTED
NORDIAZEPAM, URINE: NOT DETECTED
NORFENTANYL, URINE: NOT DETECTED
NORHYDROCODONE, URINE: NOT DETECTED
NOROXYCODONE, URINE: PRESENT
NOROXYMORPHONE, URINE: PRESENT
OXAZEPAM, URINE: NOT DETECTED
OXYCODONE URINE: PRESENT
OXYMORPHONE, URINE: NOT DETECTED
PAIN MANAGEMENT DRUG PANEL INTERP, URINE: NORMAL
PAIN MGT DRUG PANEL, HI RES, UR: NORMAL
PCP,URINE: NOT DETECTED
PHENTERMINE, UR: NOT DETECTED
PROPOXYPHENE, URINE: NOT DETECTED
TAPENTADOL, URINE: NOT DETECTED
TAPENTADOL-O-SULFATE, URINE: NOT DETECTED
TEMAZEPAM, URINE: NOT DETECTED
TRAMADOL, URINE: NOT DETECTED
ZOLPIDEM, URINE: NOT DETECTED

## 2021-03-04 DIAGNOSIS — M47.12 CERVICAL SPONDYLOSIS WITH MYELOPATHY: Chronic | ICD-10-CM

## 2021-03-04 DIAGNOSIS — G89.29 CHRONIC MIDLINE LOW BACK PAIN WITHOUT SCIATICA: ICD-10-CM

## 2021-03-04 DIAGNOSIS — M54.50 CHRONIC MIDLINE LOW BACK PAIN WITHOUT SCIATICA: ICD-10-CM

## 2021-03-04 RX ORDER — OXYCODONE HYDROCHLORIDE AND ACETAMINOPHEN 5; 325 MG/1; MG/1
1 TABLET ORAL EVERY 6 HOURS PRN
Qty: 120 TABLET | Refills: 0 | Status: SHIPPED | OUTPATIENT
Start: 2021-03-04 | End: 2021-04-03

## 2021-03-04 NOTE — TELEPHONE ENCOUNTER
Keila Flores is calling to request a refill on the following medication(s):    Medication Request:  Requested Prescriptions     Pending Prescriptions Disp Refills    oxyCODONE-acetaminophen (PERCOCET) 5-325 MG per tablet 120 tablet 0     Sig: Take 1 tablet by mouth every 6 hours as needed for Pain for up to 30 days.        Last Visit Date (If Applicable):  15/8/7988    Next Visit Date:    3/10/2021      Last Refill:  12/9/2020

## 2021-03-10 ENCOUNTER — OFFICE VISIT (OUTPATIENT)
Dept: FAMILY MEDICINE CLINIC | Age: 66
End: 2021-03-10
Payer: COMMERCIAL

## 2021-03-10 VITALS
BODY MASS INDEX: 25.22 KG/M2 | WEIGHT: 147 LBS | SYSTOLIC BLOOD PRESSURE: 118 MMHG | OXYGEN SATURATION: 98 % | TEMPERATURE: 97 F | HEART RATE: 66 BPM | DIASTOLIC BLOOD PRESSURE: 80 MMHG

## 2021-03-10 DIAGNOSIS — R56.9 SEIZURES (HCC): ICD-10-CM

## 2021-03-10 DIAGNOSIS — Z00.00 ROUTINE GENERAL MEDICAL EXAMINATION AT A HEALTH CARE FACILITY: ICD-10-CM

## 2021-03-10 DIAGNOSIS — M47.12 CERVICAL SPONDYLOSIS WITH MYELOPATHY: Primary | ICD-10-CM

## 2021-03-10 DIAGNOSIS — G89.29 CHRONIC MIDLINE LOW BACK PAIN WITHOUT SCIATICA: ICD-10-CM

## 2021-03-10 DIAGNOSIS — M54.50 CHRONIC MIDLINE LOW BACK PAIN WITHOUT SCIATICA: ICD-10-CM

## 2021-03-10 PROBLEM — G82.50 QUADRIPLEGIA (HCC): Status: RESOLVED | Noted: 2020-10-12 | Resolved: 2021-03-10

## 2021-03-10 PROCEDURE — 99213 OFFICE O/P EST LOW 20 MIN: CPT | Performed by: FAMILY MEDICINE

## 2021-03-10 PROCEDURE — G0438 PPPS, INITIAL VISIT: HCPCS | Performed by: FAMILY MEDICINE

## 2021-03-10 ASSESSMENT — ENCOUNTER SYMPTOMS
VOMITING: 0
BACK PAIN: 1
NAUSEA: 0
PHOTOPHOBIA: 0
TROUBLE SWALLOWING: 0
VISUAL CHANGE: 0
BOWEL INCONTINENCE: 0
SORE THROAT: 0
ABDOMINAL PAIN: 0
SWOLLEN GLANDS: 0
COUGH: 0
CHANGE IN BOWEL HABIT: 0

## 2021-03-10 ASSESSMENT — PATIENT HEALTH QUESTIONNAIRE - PHQ9
SUM OF ALL RESPONSES TO PHQ9 QUESTIONS 1 & 2: 1
SUM OF ALL RESPONSES TO PHQ QUESTIONS 1-9: 1
1. LITTLE INTEREST OR PLEASURE IN DOING THINGS: 1
SUM OF ALL RESPONSES TO PHQ QUESTIONS 1-9: 1

## 2021-03-10 ASSESSMENT — LIFESTYLE VARIABLES
HOW OFTEN DO YOU HAVE SIX OR MORE DRINKS ON ONE OCCASION: 0
HOW OFTEN DURING THE LAST YEAR HAVE YOU BEEN UNABLE TO REMEMBER WHAT HAPPENED THE NIGHT BEFORE BECAUSE YOU HAD BEEN DRINKING: 0
HOW OFTEN DURING THE LAST YEAR HAVE YOU FAILED TO DO WHAT WAS NORMALLY EXPECTED FROM YOU BECAUSE OF DRINKING: 0
HOW OFTEN DURING THE LAST YEAR HAVE YOU NEEDED AN ALCOHOLIC DRINK FIRST THING IN THE MORNING TO GET YOURSELF GOING AFTER A NIGHT OF HEAVY DRINKING: 0
HOW OFTEN DURING THE LAST YEAR HAVE YOU FOUND THAT YOU WERE NOT ABLE TO STOP DRINKING ONCE YOU HAD STARTED: 0
HOW MANY STANDARD DRINKS CONTAINING ALCOHOL DO YOU HAVE ON A TYPICAL DAY: 0

## 2021-03-10 NOTE — PATIENT INSTRUCTIONS
that contain them include:  · Codeine (Tylenol 3). · Hydrocodone (Norco). · Oxycodone (OxyContin, Percocet). Safety tips  If you need to take opioids to manage your pain, remember these safety tips. · Follow directions carefully. It's easy to misuse opioids if you take a dose other than what's prescribed by your doctor. This can lead to overdose and even death. Even sharing them with someone they weren't meant for is misuse. · Be cautious. Opioids may affect your judgment and decision making. Do not drive or operate machinery until you can think clearly. Talk with your doctor about when it is safe to drive. · Reduce the risk of drug interactions. Opioids can be dangerous if you take them with alcohol or with certain drugs like sleeping pills and muscle relaxers. Make sure your doctor knows about all the other medicines you take, including over-the-counter medicines. Don't start any new medicines before you talk to your doctor or pharmacist.  · Safely store and dispose of opioids. Store opioids in a safe and secure place. Make sure that pets, children, friends, and family can't get to them. When you're done using opioids, make sure to dispose of them safely and as quickly as possible. The U.S. Food and Drug Administration (FDA) recommends these disposal options. ? The best option is to take your medicine to a drop-off box or take-back program that is authorized by the [facility] [address] ([facility]). ? If these programs aren't available in your area and your medicine doesn't have specific disposal instructions (such as flushing), you can throw them into your household trash if you follow the FDA's instructions. Visit fda.gov and search for \"unused medicine disposal.\"  ? If you have opioid patches (used or unused), your options are to take them to a [facility]-authorized site or flush them down the toilet. Do not throw them in the trash.   ? Only flush your medicine down the toilet if you can't get to a [facility]-approved site or your medicine instructions state clearly to flush them. · Reduce the risk of overdose. Misuse of opioids can be very dangerous. Protect yourself by asking your doctor about a naloxone rescue kit. It can help youand even save your lifeif you take too much of an opioid. Who is most at risk? Your risk rises if you misuse opioids, take high doses, have certain health problems, or if you've overdosed before. You're also at higher risk if you use them with another substance or take illegal opioids, or if you used them regularly and then take them again after you'd cut back or stopped. When should you call for help? Call 911 anytime you think you may need emergency care. For example, call if:  · You have symptoms of a severe allergic reaction. These may include:  ? Sudden raised, red areas (hives) all over your body. ? Swelling of the throat, mouth, lips, or tongue. ? Trouble breathing. ? Passing out (losing consciousness). Or you may feel very lightheaded or suddenly feel weak, confused, or restless. · You have signs of an overdose. These include:  ? Cold, clammy skin. ? Confusion. ? Severe nervousness or restlessness. ? Severe dizziness, drowsiness, or weakness. ? Slow breathing. ? Seizures. Call your doctor now or seek immediate medical care if:  · You have symptoms of an allergic reaction, such as:  ? A rash or hives (raised, red areas on the skin). ? Itching. ? Swelling. ? Belly pain, nausea, or vomiting. Watch closely for changes in your health, and be sure to contact your doctor if:  · You think you might be taking too much pain medicine, and you need help to take less or stop. · Your medicine is not helping with the pain. · You are having side effects, such as constipation. Where can you learn more? Go to https://lexus.Retrieve. org and sign in to your [employer] account.  Enter Q903 in the WeShow box to learn more about \"Learning About Opioids. \"     If you do not have an account, please click on the \"Sign Up Now\" link. Current as of: November 20, 2019               Content Version: 12.6  © 4327-9283 [employer]. Care instructions adapted under license by [facility] ([facility]). If you have questions about a medical condition or this instruction, always ask your healthcare professional. [address] any warranty or liability for your use of this information. Personalized Preventive Plan for Ailyn Carreon - 3/10/2021  Medicare offers a range of preventive health benefits. Some of the tests and screenings are paid in full while other may be subject to a deductible, co-insurance, and/or copay. Some of these benefits include a comprehensive review of your medical history including lifestyle, illnesses that may run in your family, and various assessments and screenings as appropriate. After reviewing your medical record and screening and assessments performed today your provider may have ordered immunizations, labs, imaging, and/or referrals for you. A list of these orders (if applicable) as well as your Preventive Care list are included within your After Visit Summary for your review. Other Preventive Recommendations:    · A preventive eye exam performed by an eye specialist is recommended every 1-2 years to screen for glaucoma; cataracts, macular degeneration, and other eye disorders. · A preventive dental visit is recommended every 6 months. · Try to get at least 150 minutes of exercise per week or 10,000 steps per day on a pedometer . · Order or download the FREE \"Exercise & Physical Activity: Your Everyday Guide\" from [employer]. Call 7-872.839.6947 or search [employer] online. · You need 3987-1254 mg of calcium and 6214-1332 IU of vitamin D per day.  It is possible to meet your calcium requirement with diet alone, but a vitamin D supplement is usually necessary to meet this goal.  · When exposed to the sun, use a sunscreen that protects against both UVA and UVB radiation with an SPF of 30 or greater. Reapply every 2 to 3 hours or after sweating, drying off with a towel, or swimming. · Always wear a seat belt when traveling in a car. Always wear a helmet when riding a bicycle or motorcycle.

## 2021-03-10 NOTE — ASSESSMENT & PLAN NOTE
Stable on pain medication  She has weaned herself down to Perc 5mg once daily  Has been trying marijuana and CBD to help with pain to try and get off pain meds  Advised to get medical marijuana license and given printout of info Wound Care: Vaseline

## 2021-03-10 NOTE — ASSESSMENT & PLAN NOTE
Stable on pain medication  She has weaned herself down to Perc 5mg once daily  Has been trying marijuana and CBD to help with pain to try and get off pain meds  Advised to get medical marijuana license and given printout of info
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
## 2021-06-09 ENCOUNTER — TELEPHONE (OUTPATIENT)
Dept: FAMILY MEDICINE CLINIC | Age: 66
End: 2021-06-09
